# Patient Record
Sex: FEMALE | Race: WHITE | NOT HISPANIC OR LATINO | Employment: FULL TIME | URBAN - METROPOLITAN AREA
[De-identification: names, ages, dates, MRNs, and addresses within clinical notes are randomized per-mention and may not be internally consistent; named-entity substitution may affect disease eponyms.]

---

## 2017-01-23 ENCOUNTER — PATIENT MESSAGE (OUTPATIENT)
Dept: FAMILY MEDICINE | Facility: CLINIC | Age: 47
End: 2017-01-23

## 2017-01-23 RX ORDER — TRAZODONE HYDROCHLORIDE 50 MG/1
50 TABLET ORAL NIGHTLY
Qty: 30 TABLET | Refills: 2 | Status: SHIPPED | OUTPATIENT
Start: 2017-01-23 | End: 2017-04-29 | Stop reason: SDUPTHER

## 2017-04-04 ENCOUNTER — HOSPITAL ENCOUNTER (OUTPATIENT)
Dept: RADIOLOGY | Facility: HOSPITAL | Age: 47
Discharge: HOME | End: 2017-04-04
Attending: FAMILY MEDICINE
Payer: COMMERCIAL

## 2017-04-04 DIAGNOSIS — Z12.31 OTHER SCREENING MAMMOGRAM: ICD-10-CM

## 2017-04-04 PROCEDURE — 77067 SCR MAMMO BI INCL CAD: CPT | Mod: TC

## 2017-04-04 PROCEDURE — 77063 BREAST TOMOSYNTHESIS BI: CPT | Mod: 26,,, | Performed by: RADIOLOGY

## 2017-04-04 PROCEDURE — 77067 SCR MAMMO BI INCL CAD: CPT | Mod: 26,,, | Performed by: RADIOLOGY

## 2017-04-17 ENCOUNTER — PATIENT MESSAGE (OUTPATIENT)
Dept: FAMILY MEDICINE | Facility: CLINIC | Age: 47
End: 2017-04-17

## 2017-04-17 NOTE — TELEPHONE ENCOUNTER
Voicemail left for the patient advising a call back to scheduled an appointment. [employer] message has been sent to the patient advising that she need to call to schedule if the swelling is still present, also requested that for urgent issues the patient call.

## 2017-04-30 RX ORDER — TRAZODONE HYDROCHLORIDE 50 MG/1
TABLET ORAL
Qty: 30 TABLET | Refills: 5 | Status: SHIPPED | OUTPATIENT
Start: 2017-04-30 | End: 2017-12-11

## 2017-07-31 ENCOUNTER — TELEPHONE (OUTPATIENT)
Dept: FAMILY MEDICINE | Facility: CLINIC | Age: 47
End: 2017-07-31

## 2017-07-31 NOTE — TELEPHONE ENCOUNTER
----- Message from Claudia Stephenson sent at 7/31/2017  8:24 AM CDT -----  Doctor appointment and lab have been scheduled.  Please link lab orders to the lab appointment.  Date of doctor appointment:    Physical or EP:  9/25/2017 Annual Physical  Date of lab appointment:  9/18/2017  Comments:

## 2017-08-10 ENCOUNTER — TELEPHONE (OUTPATIENT)
Dept: FAMILY MEDICINE | Facility: CLINIC | Age: 47
End: 2017-08-10

## 2017-08-10 DIAGNOSIS — Z00.00 ANNUAL PHYSICAL EXAM: Primary | ICD-10-CM

## 2017-08-10 NOTE — TELEPHONE ENCOUNTER
----- Message from Claudia Stephenson sent at 8/10/2017  8:06 AM CDT -----  Doctor appointment and lab have been scheduled.  Please link lab orders to the lab appointment.  Date of doctor appointment:    Physical or EP:  12/11  Date of lab appointment:  12.1

## 2017-12-04 ENCOUNTER — LAB VISIT (OUTPATIENT)
Dept: LAB | Facility: HOSPITAL | Age: 47
End: 2017-12-04
Attending: FAMILY MEDICINE
Payer: COMMERCIAL

## 2017-12-04 DIAGNOSIS — Z00.00 ANNUAL PHYSICAL EXAM: ICD-10-CM

## 2017-12-04 LAB
ALBUMIN SERPL BCP-MCNC: 4 G/DL
ALP SERPL-CCNC: 53 U/L
ALT SERPL W/O P-5'-P-CCNC: 14 U/L
ANION GAP SERPL CALC-SCNC: 7 MMOL/L
AST SERPL-CCNC: 18 U/L
BASOPHILS # BLD AUTO: 0.04 K/UL
BASOPHILS NFR BLD: 0.7 %
BILIRUB SERPL-MCNC: 0.6 MG/DL
BUN SERPL-MCNC: 16 MG/DL
CALCIUM SERPL-MCNC: 9.3 MG/DL
CHLORIDE SERPL-SCNC: 107 MMOL/L
CHOLEST SERPL-MCNC: 174 MG/DL
CHOLEST/HDLC SERPL: 1.9 {RATIO}
CO2 SERPL-SCNC: 26 MMOL/L
CREAT SERPL-MCNC: 0.7 MG/DL
DIFFERENTIAL METHOD: NORMAL
EOSINOPHIL # BLD AUTO: 0.4 K/UL
EOSINOPHIL NFR BLD: 7.6 %
ERYTHROCYTE [DISTWIDTH] IN BLOOD BY AUTOMATED COUNT: 12.2 %
EST. GFR  (AFRICAN AMERICAN): >60 ML/MIN/1.73 M^2
EST. GFR  (NON AFRICAN AMERICAN): >60 ML/MIN/1.73 M^2
GLUCOSE SERPL-MCNC: 94 MG/DL
HCT VFR BLD AUTO: 41.8 %
HDLC SERPL-MCNC: 93 MG/DL
HDLC SERPL: 53.4 %
HGB BLD-MCNC: 13.8 G/DL
IMM GRANULOCYTES # BLD AUTO: 0.01 K/UL
IMM GRANULOCYTES NFR BLD AUTO: 0.2 %
LDLC SERPL CALC-MCNC: 69.2 MG/DL
LYMPHOCYTES # BLD AUTO: 2.6 K/UL
LYMPHOCYTES NFR BLD: 45.5 %
MCH RBC QN AUTO: 29.4 PG
MCHC RBC AUTO-ENTMCNC: 33 G/DL
MCV RBC AUTO: 89 FL
MONOCYTES # BLD AUTO: 0.4 K/UL
MONOCYTES NFR BLD: 7.3 %
NEUTROPHILS # BLD AUTO: 2.2 K/UL
NEUTROPHILS NFR BLD: 38.7 %
NONHDLC SERPL-MCNC: 81 MG/DL
NRBC BLD-RTO: 0 /100 WBC
PLATELET # BLD AUTO: 216 K/UL
PMV BLD AUTO: 9.6 FL
POTASSIUM SERPL-SCNC: 4.1 MMOL/L
PROT SERPL-MCNC: 7 G/DL
RBC # BLD AUTO: 4.7 M/UL
SODIUM SERPL-SCNC: 140 MMOL/L
TRIGL SERPL-MCNC: 59 MG/DL
TSH SERPL DL<=0.005 MIU/L-ACNC: 1.48 UIU/ML
WBC # BLD AUTO: 5.65 K/UL

## 2017-12-04 PROCEDURE — 85025 COMPLETE CBC W/AUTO DIFF WBC: CPT

## 2017-12-04 PROCEDURE — 80061 LIPID PANEL: CPT

## 2017-12-04 PROCEDURE — 80053 COMPREHEN METABOLIC PANEL: CPT

## 2017-12-04 PROCEDURE — 84443 ASSAY THYROID STIM HORMONE: CPT

## 2017-12-04 PROCEDURE — 36415 COLL VENOUS BLD VENIPUNCTURE: CPT | Mod: PO

## 2017-12-11 ENCOUNTER — LAB VISIT (OUTPATIENT)
Dept: LAB | Facility: HOSPITAL | Age: 47
End: 2017-12-11
Attending: FAMILY MEDICINE
Payer: COMMERCIAL

## 2017-12-11 ENCOUNTER — OFFICE VISIT (OUTPATIENT)
Dept: FAMILY MEDICINE | Facility: CLINIC | Age: 47
End: 2017-12-11
Payer: COMMERCIAL

## 2017-12-11 VITALS
WEIGHT: 104.94 LBS | TEMPERATURE: 98 F | HEIGHT: 62 IN | SYSTOLIC BLOOD PRESSURE: 104 MMHG | BODY MASS INDEX: 19.31 KG/M2 | DIASTOLIC BLOOD PRESSURE: 72 MMHG

## 2017-12-11 DIAGNOSIS — M77.8 RIGHT ELBOW TENDONITIS: ICD-10-CM

## 2017-12-11 DIAGNOSIS — G89.29 CHRONIC HIP PAIN, UNSPECIFIED LATERALITY: ICD-10-CM

## 2017-12-11 DIAGNOSIS — G43.009 MIGRAINE WITHOUT AURA AND WITHOUT STATUS MIGRAINOSUS, NOT INTRACTABLE: ICD-10-CM

## 2017-12-11 DIAGNOSIS — F51.01 PRIMARY INSOMNIA: ICD-10-CM

## 2017-12-11 DIAGNOSIS — Z00.00 ROUTINE GENERAL MEDICAL EXAMINATION AT A HEALTH CARE FACILITY: Primary | ICD-10-CM

## 2017-12-11 DIAGNOSIS — M25.559 CHRONIC HIP PAIN, UNSPECIFIED LATERALITY: ICD-10-CM

## 2017-12-11 DIAGNOSIS — R23.2 VASOMOTOR FLUSHING: ICD-10-CM

## 2017-12-11 DIAGNOSIS — M25.50 ARTHRALGIA, UNSPECIFIED JOINT: ICD-10-CM

## 2017-12-11 DIAGNOSIS — M25.559 HIP PAIN, CHRONIC, UNSPECIFIED LATERALITY: ICD-10-CM

## 2017-12-11 DIAGNOSIS — G89.29 HIP PAIN, CHRONIC, UNSPECIFIED LATERALITY: ICD-10-CM

## 2017-12-11 LAB
CCP AB SER IA-ACNC: 0.6 U/ML
CRP SERPL-MCNC: 0.5 MG/L
ERYTHROCYTE [SEDIMENTATION RATE] IN BLOOD BY WESTERGREN METHOD: 1 MM/HR
RHEUMATOID FACT SERPL-ACNC: <10 IU/ML

## 2017-12-11 PROCEDURE — 85651 RBC SED RATE NONAUTOMATED: CPT

## 2017-12-11 PROCEDURE — 99999 PR PBB SHADOW E&M-EST. PATIENT-LVL III: CPT | Mod: PBBFAC,,, | Performed by: FAMILY MEDICINE

## 2017-12-11 PROCEDURE — 86431 RHEUMATOID FACTOR QUANT: CPT

## 2017-12-11 PROCEDURE — 36415 COLL VENOUS BLD VENIPUNCTURE: CPT | Mod: PO

## 2017-12-11 PROCEDURE — 86200 CCP ANTIBODY: CPT

## 2017-12-11 PROCEDURE — 86038 ANTINUCLEAR ANTIBODIES: CPT

## 2017-12-11 PROCEDURE — 99396 PREV VISIT EST AGE 40-64: CPT | Mod: S$GLB,,, | Performed by: FAMILY MEDICINE

## 2017-12-11 PROCEDURE — 86140 C-REACTIVE PROTEIN: CPT

## 2017-12-11 PROCEDURE — 86039 ANTINUCLEAR ANTIBODIES (ANA): CPT

## 2017-12-11 PROCEDURE — 86235 NUCLEAR ANTIGEN ANTIBODY: CPT | Mod: 59

## 2017-12-11 RX ORDER — ALMOTRIPTAN 12.5 MG/1
TABLET, FILM COATED ORAL
Qty: 9 TABLET | Refills: 9 | Status: SHIPPED | OUTPATIENT
Start: 2017-12-11 | End: 2018-12-14 | Stop reason: SDUPTHER

## 2017-12-11 RX ORDER — SUMATRIPTAN SUCCINATE 100 MG/1
TABLET ORAL
Qty: 9 TABLET | Refills: 9 | Status: SHIPPED | OUTPATIENT
Start: 2017-12-11 | End: 2018-12-14 | Stop reason: SDUPTHER

## 2017-12-11 RX ORDER — ESCITALOPRAM OXALATE 10 MG/1
10 TABLET ORAL DAILY
Qty: 30 TABLET | Refills: 9 | Status: SHIPPED | OUTPATIENT
Start: 2017-12-11 | End: 2018-11-19 | Stop reason: SDUPTHER

## 2017-12-11 RX ORDER — MELOXICAM 15 MG/1
TABLET ORAL
Qty: 60 TABLET | Refills: 2 | Status: SHIPPED | OUTPATIENT
Start: 2017-12-11 | End: 2018-12-14 | Stop reason: SDUPTHER

## 2017-12-11 RX ORDER — ESCITALOPRAM OXALATE 10 MG/1
10 TABLET ORAL DAILY
Qty: 30 TABLET | Refills: 5 | Status: SHIPPED | OUTPATIENT
Start: 2017-12-11 | End: 2017-12-11 | Stop reason: SDUPTHER

## 2017-12-11 RX ORDER — CLONAZEPAM 0.5 MG/1
0.5 TABLET ORAL NIGHTLY PRN
Qty: 30 TABLET | Refills: 2 | Status: SHIPPED | OUTPATIENT
Start: 2017-12-11 | End: 2018-12-14

## 2017-12-11 NOTE — PROGRESS NOTES
Subjective:     Patient ID: Moody Ga is a 47 y.o. female.    Chief Complaint: Annual Exam    HPI  Review of Systems   Constitutional: Negative for activity change and unexpected weight change.   HENT: Negative for hearing loss, rhinorrhea and trouble swallowing.    Eyes: Negative for discharge and visual disturbance.   Respiratory: Negative for chest tightness and wheezing.    Cardiovascular: Negative for chest pain and palpitations.   Gastrointestinal: Negative for blood in stool, constipation, diarrhea and vomiting.   Endocrine: Negative for polydipsia and polyuria.   Genitourinary: Negative for difficulty urinating, dysuria, hematuria and menstrual problem.        Menses q 6-7 weeks, fatigue, apathy,   Musculoskeletal: Positive for arthralgias (rt elbow , left knee and left thumb pain off and on - as well as her labrum tears for whiuch she takes mobic prn). Negative for joint swelling and neck pain.   Neurological: Positive for headaches (off and on - a couple times a week. using imetrex or axert. ). Negative for weakness.   Psychiatric/Behavioral: Positive for dysphoric mood and sleep disturbance (trouble sleeping, hot and cold. hot flashes). Negative for confusion.       Objective:      Physical Exam   Constitutional: She is oriented to person, place, and time. She appears well-developed and well-nourished.   HENT:   Head: Normocephalic and atraumatic.   Right Ear: External ear normal.   Left Ear: External ear normal.   Nose: Nose normal.   Mouth/Throat: Oropharynx is clear and moist.   Eyes: Conjunctivae and EOM are normal. Pupils are equal, round, and reactive to light.   Neck: Normal range of motion. Neck supple. No JVD present. No thyromegaly present.   Cardiovascular: Normal rate, regular rhythm, normal heart sounds and intact distal pulses.    No murmur heard.  Pulmonary/Chest: Effort normal and breath sounds normal.   Abdominal: Soft. Bowel sounds are normal. She exhibits no mass. There is no  tenderness.   Musculoskeletal: Normal range of motion. She exhibits no edema.   Rt elbow pain at  Epicondyle, no other significant arthritic deformity or erythema or edema   Lymphadenopathy:     She has no cervical adenopathy.   Neurological: She is alert and oriented to person, place, and time. She has normal reflexes.   Skin: Skin is warm and dry.   Psychiatric: She has a normal mood and affect. Her behavior is normal. Judgment and thought content normal.   Vitals reviewed.      Assessment:     Moody was seen today for annual exam.    Diagnoses and all orders for this visit:    Routine general medical examination at a [facility] facility    Vasomotor flushing  -     Discontinue: escitalopram oxalate (LEXAPRO) 10 MG tablet; Take 1 tablet (10 mg total) by mouth once daily.  -     escitalopram oxalate (LEXAPRO) 10 MG tablet; Take 1 tablet (10 mg total) by mouth once daily.    Chronic hip pain, unspecified laterality    Arthralgia, unspecified joint  -     BRIAN; Future  -     C-reactive protein; Future  -     Cyclic citrul peptide antibody, IgG; Future  -     Rheumatoid factor; Future  -     Sedimentation rate, manual; Future    Migraine without aura and without status migrainosus, not intractable  -     almotriptan (AXERT) 12.5 MG tablet; take 1 tablet by mouth once daily (USE AT FIRST SIGN OF MIGRAINE--NOT TO EXCEED 2 TABLETS IN 24 HOURS)  -     sumatriptan (IMITREX) 100 MG tablet; take 1 tablet by mouth once daily (MAY REPEAT ONCE IN 2 HOURS)    Hip pain, chronic, unspecified laterality  -     meloxicam (MOBIC) 15 MG tablet; take 1 tablet by mouth once daily WITH A PRILOSEC 20MG OVER THE COUNTER    Right elbow tendonitis    Primary insomnia  Stop trazadone because we are adding escitalapram and I dont want her to get too much seratonin effect.   Will change to clonazepam at hs prn   -     clonazePAM (KLONOPIN) 0.5 MG tablet; Take 1 tablet (0.5 mg total) by mouth nightly as needed for Anxiety.  Hopefully as the  escitalopram begins to work -she will find she may not need the clonazepam.     She is to email me in another month with her progress on sleep and migraines frequency (if not better will increase topaimax to 100mg a day - and then if that doesn't help will refer to neurology)  And with progress on vasomotor symptoms with escitalopram.

## 2017-12-11 NOTE — PATIENT INSTRUCTIONS
Elbow tendon  Tennis Elbow  Muscles connect to bones by thick, fibrous cords (tendons). When the muscles are overused by repeated motion, the tendons may become inflamed and painful. This condition is called tendonitis.  Tennis elbow (lateral epicondylitis) is a form of tendonitis. It occurs when the forearm muscles are used again and again in a twisting motion. Pain from tennis elbow occurs mainly on the outside of the elbow. But the pain can spread into the forearm and wrist. Your elbow may also be swollen and tender to the touch.  The pain may get worse when you move your arm or do simple activities. Bending your wrist back, shaking hands, or turning a doorknob may cause pain. The pain often gets worse after several weeks or months. Sometimes you may feel pain when your arm is still.  Tennis players who use a backhand stroke with poor technique are more likely to get tennis elbow. But playing tennis is only one cause of tennis elbow. Other common activities that can cause it include:  · Hammering  · Painting  · Raking  Besides tennis players, people at risk include , gardeners, musicians, and dentists. Sometimes people get tennis elbow without doing anything that would cause the injury.  Treatment includes resting the arm and taking anti-inflammatory medicines. Special splints help ease symptoms. Symptoms should get better after 4 to 6 weeks of rest. You may need steroid injections if resting and using a splint dont help. After the pain is relieved, you should change your activities so the symptoms dont return. You may need physical therapy. It may include stretching, range-of-motion, and strengthening exercises. These treatments help most cases. You may need surgery if your symptoms continue for 6 months.  Home care  Follow these guidelines when caring for yourself at home:  · Rest your elbow as needed. Protect it from movement that causes pain. You may be told to use a forearm splint at night to  ease symptoms in the morning. Your health care provider may recommend a special wrap or splint to compress the muscles of the forearm. This can ease pain during daytime activities. As your symptoms get better, start to move your elbow more.  · Put an ice pack on the injured area. Do this for 20 minutes every 1 to 2 hours the first day for pain relief. You can make an ice pack by wrapping a plastic bag of ice cubes in a thin towel. Continue using the ice pack 3 to 4 times a day for the next 2 days. Then use the ice pack as needed to ease pain and swelling.  · You may use acetaminophen or ibuprofen to control pain, unless another pain medicine was prescribed. If you have chronic liver or kidney disease, talk with your health care provider before using these medicines. Also talk with your provider if youve had a stomach ulcer or GI bleeding.  · After your elbow heals, avoid the motion that caused your pain. Or learn to move in a way that causes less stress on the tendon. Using a forearm wrap may keep tennis elbow from happening again.  · A tennis elbow strap may ease pain and keep you from further injury when you start playing tennis again. You can also lower your risk for injury by warming up before you play and cooling down afterward. You should also use the right equipment. For instance, make sure your racquet has the right  and is the right size for you.  Follow-up care  Follow up with your health care provider, or as advised, if your symptoms dont get better after 1 week of treatment.  When to seek medical advice  Call your health care provider right away if any of these occur:  · Redness over the painful area  · Pain or swelling at the elbow gets worse  · Any numbness or tingling in your arm, hands, or fingers  · Unexplained fever over 101ºF (37.8ºC)   Date Last Reviewed: 2/17/2015  © 2059-3364 [employer]. [address] [place] [address], [place] [address]. All rights reserved. This information is not  intended as a substitute for professional medical care. Always follow your healthcare professional's instructions.

## 2017-12-12 LAB — ANA SER QL IF: POSITIVE

## 2017-12-13 LAB — ANA TITR SER IF: NORMAL {TITER}

## 2017-12-14 LAB
ANTI SM ANTIBODY: 0.51 EU
ANTI SM/RNP ANTIBODY: 1.49 EU
ANTI-SM INTERPRETATION: NEGATIVE
ANTI-SM/RNP INTERPRETATION: NEGATIVE
ANTI-SSA ANTIBODY: 1.6 EU
ANTI-SSA INTERPRETATION: NEGATIVE
ANTI-SSB ANTIBODY: 5.19 EU
ANTI-SSB INTERPRETATION: NEGATIVE
DSDNA AB SER-ACNC: NORMAL [IU]/ML

## 2018-02-11 DIAGNOSIS — G43.009 MIGRAINE WITHOUT AURA AND WITHOUT STATUS MIGRAINOSUS, NOT INTRACTABLE: ICD-10-CM

## 2018-02-12 RX ORDER — TOPIRAMATE 50 MG/1
TABLET, FILM COATED ORAL
Qty: 30 TABLET | Refills: 9 | Status: SHIPPED | OUTPATIENT
Start: 2018-02-12 | End: 2018-12-14 | Stop reason: SDUPTHER

## 2018-02-12 NOTE — TELEPHONE ENCOUNTER
----- Message from Vashti Chan sent at 2/12/2018  4:16 PM CST -----  Contact: [facility]   [phone]   Rx Name: topiramate (TOPAMAX) 50 MG tablet    Refill: yes     Pharmacy: [facility] [facility] [place] [address]    Comments:    Please call and advise.    Thank You

## 2018-07-30 ENCOUNTER — TELEPHONE (OUTPATIENT)
Dept: FAMILY MEDICINE | Facility: CLINIC | Age: 48
End: 2018-07-30

## 2018-07-30 DIAGNOSIS — Z12.39 SCREENING BREAST EXAMINATION: Primary | ICD-10-CM

## 2018-07-30 NOTE — TELEPHONE ENCOUNTER
----- Message from Chely Carmen sent at 7/30/2018  2:06 PM CDT -----  Contact: Moody Ga [phone]  Caller is requesting to schedule their annual screening mammogram appointment. Order is not listed in Epic.  Please enter order and contact patient to schedule.    Where would they like the mammogram performed?:  [facility]   Additional information:

## 2018-08-10 ENCOUNTER — HOSPITAL ENCOUNTER (OUTPATIENT)
Dept: RADIOLOGY | Facility: HOSPITAL | Age: 48
Discharge: HOME | End: 2018-08-10
Attending: FAMILY MEDICINE
Payer: COMMERCIAL

## 2018-08-10 VITALS — BODY MASS INDEX: 19.14 KG/M2 | WEIGHT: 104 LBS | HEIGHT: 62 IN

## 2018-08-10 DIAGNOSIS — Z12.39 SCREENING BREAST EXAMINATION: ICD-10-CM

## 2018-08-10 PROCEDURE — 77063 BREAST TOMOSYNTHESIS BI: CPT | Mod: 26,,, | Performed by: RADIOLOGY

## 2018-08-10 PROCEDURE — 77067 SCR MAMMO BI INCL CAD: CPT | Mod: 26,,, | Performed by: RADIOLOGY

## 2018-08-10 PROCEDURE — 77067 SCR MAMMO BI INCL CAD: CPT | Mod: TC,PO

## 2018-09-24 ENCOUNTER — PATIENT MESSAGE (OUTPATIENT)
Dept: FAMILY MEDICINE | Facility: CLINIC | Age: 48
End: 2018-09-24

## 2018-09-24 DIAGNOSIS — Z00.00 ROUTINE GENERAL MEDICAL EXAMINATION AT A HEALTH CARE FACILITY: Primary | ICD-10-CM

## 2018-11-19 DIAGNOSIS — R23.2 VASOMOTOR FLUSHING: ICD-10-CM

## 2018-11-19 RX ORDER — ESCITALOPRAM OXALATE 10 MG/1
TABLET ORAL
Qty: 30 TABLET | Refills: 9 | Status: SHIPPED | OUTPATIENT
Start: 2018-11-19 | End: 2018-12-14

## 2018-12-12 ENCOUNTER — LAB VISIT (OUTPATIENT)
Dept: LAB | Facility: HOSPITAL | Age: 48
End: 2018-12-12
Attending: FAMILY MEDICINE
Payer: COMMERCIAL

## 2018-12-12 DIAGNOSIS — Z00.00 ROUTINE GENERAL MEDICAL EXAMINATION AT A HEALTH CARE FACILITY: ICD-10-CM

## 2018-12-12 LAB
ALBUMIN SERPL BCP-MCNC: 3.9 G/DL
ALP SERPL-CCNC: 48 U/L
ALT SERPL W/O P-5'-P-CCNC: 14 U/L
ANION GAP SERPL CALC-SCNC: 7 MMOL/L
AST SERPL-CCNC: 21 U/L
BASOPHILS # BLD AUTO: 0.04 K/UL
BASOPHILS NFR BLD: 0.8 %
BILIRUB SERPL-MCNC: 0.6 MG/DL
BUN SERPL-MCNC: 14 MG/DL
CALCIUM SERPL-MCNC: 9.8 MG/DL
CHLORIDE SERPL-SCNC: 106 MMOL/L
CHOLEST SERPL-MCNC: 184 MG/DL
CHOLEST/HDLC SERPL: 1.9 {RATIO}
CO2 SERPL-SCNC: 26 MMOL/L
CREAT SERPL-MCNC: 0.7 MG/DL
DIFFERENTIAL METHOD: NORMAL
EOSINOPHIL # BLD AUTO: 0.2 K/UL
EOSINOPHIL NFR BLD: 3.6 %
ERYTHROCYTE [DISTWIDTH] IN BLOOD BY AUTOMATED COUNT: 12.8 %
EST. GFR  (AFRICAN AMERICAN): >60 ML/MIN/1.73 M^2
EST. GFR  (NON AFRICAN AMERICAN): >60 ML/MIN/1.73 M^2
GLUCOSE SERPL-MCNC: 87 MG/DL
HCT VFR BLD AUTO: 41.8 %
HDLC SERPL-MCNC: 95 MG/DL
HDLC SERPL: 51.6 %
HGB BLD-MCNC: 13.6 G/DL
IMM GRANULOCYTES # BLD AUTO: 0.01 K/UL
IMM GRANULOCYTES NFR BLD AUTO: 0.2 %
LDLC SERPL CALC-MCNC: 82.2 MG/DL
LYMPHOCYTES # BLD AUTO: 2.3 K/UL
LYMPHOCYTES NFR BLD: 46.2 %
MCH RBC QN AUTO: 29.9 PG
MCHC RBC AUTO-ENTMCNC: 32.5 G/DL
MCV RBC AUTO: 92 FL
MONOCYTES # BLD AUTO: 0.5 K/UL
MONOCYTES NFR BLD: 9 %
NEUTROPHILS # BLD AUTO: 2 K/UL
NEUTROPHILS NFR BLD: 40.2 %
NONHDLC SERPL-MCNC: 89 MG/DL
NRBC BLD-RTO: 0 /100 WBC
PLATELET # BLD AUTO: 263 K/UL
PMV BLD AUTO: 9.8 FL
POTASSIUM SERPL-SCNC: 4.4 MMOL/L
PROT SERPL-MCNC: 6.9 G/DL
RBC # BLD AUTO: 4.55 M/UL
SODIUM SERPL-SCNC: 139 MMOL/L
TRIGL SERPL-MCNC: 34 MG/DL
TSH SERPL DL<=0.005 MIU/L-ACNC: 2.15 UIU/ML
WBC # BLD AUTO: 4.98 K/UL

## 2018-12-12 PROCEDURE — 85025 COMPLETE CBC W/AUTO DIFF WBC: CPT

## 2018-12-12 PROCEDURE — 80053 COMPREHEN METABOLIC PANEL: CPT

## 2018-12-12 PROCEDURE — 84443 ASSAY THYROID STIM HORMONE: CPT

## 2018-12-12 PROCEDURE — 80061 LIPID PANEL: CPT

## 2018-12-12 PROCEDURE — 36415 COLL VENOUS BLD VENIPUNCTURE: CPT | Mod: PO

## 2018-12-14 ENCOUNTER — OFFICE VISIT (OUTPATIENT)
Dept: FAMILY MEDICINE | Facility: CLINIC | Age: 48
End: 2018-12-14
Payer: COMMERCIAL

## 2018-12-14 VITALS
SYSTOLIC BLOOD PRESSURE: 98 MMHG | HEART RATE: 76 BPM | BODY MASS INDEX: 20.98 KG/M2 | HEIGHT: 62 IN | WEIGHT: 114 LBS | DIASTOLIC BLOOD PRESSURE: 60 MMHG | TEMPERATURE: 99 F

## 2018-12-14 DIAGNOSIS — G43.009 MIGRAINE WITHOUT AURA AND WITHOUT STATUS MIGRAINOSUS, NOT INTRACTABLE: ICD-10-CM

## 2018-12-14 DIAGNOSIS — M77.8 RIGHT ELBOW TENDONITIS: ICD-10-CM

## 2018-12-14 DIAGNOSIS — H04.303 BILATERAL DACRYOCYSTITIS: ICD-10-CM

## 2018-12-14 DIAGNOSIS — M25.551 PAIN OF BOTH HIP JOINTS: ICD-10-CM

## 2018-12-14 DIAGNOSIS — M25.559 HIP PAIN, CHRONIC, UNSPECIFIED LATERALITY: ICD-10-CM

## 2018-12-14 DIAGNOSIS — G89.29 HIP PAIN, CHRONIC, UNSPECIFIED LATERALITY: ICD-10-CM

## 2018-12-14 DIAGNOSIS — F51.01 PRIMARY INSOMNIA: ICD-10-CM

## 2018-12-14 DIAGNOSIS — R23.2 VASOMOTOR FLUSHING: ICD-10-CM

## 2018-12-14 DIAGNOSIS — Z00.00 ROUTINE GENERAL MEDICAL EXAMINATION AT A HEALTH CARE FACILITY: Primary | ICD-10-CM

## 2018-12-14 DIAGNOSIS — Z79.890 HORMONE REPLACEMENT THERAPY (HRT): ICD-10-CM

## 2018-12-14 DIAGNOSIS — M25.552 PAIN OF BOTH HIP JOINTS: ICD-10-CM

## 2018-12-14 PROCEDURE — 99396 PREV VISIT EST AGE 40-64: CPT | Mod: S$GLB,,, | Performed by: FAMILY MEDICINE

## 2018-12-14 PROCEDURE — 99999 PR PBB SHADOW E&M-EST. PATIENT-LVL III: CPT | Mod: PBBFAC,,, | Performed by: FAMILY MEDICINE

## 2018-12-14 RX ORDER — TOPIRAMATE 50 MG/1
50 TABLET, FILM COATED ORAL 2 TIMES DAILY
Qty: 180 TABLET | Refills: 3 | Status: SHIPPED | OUTPATIENT
Start: 2018-12-14 | End: 2019-11-27 | Stop reason: SDUPTHER

## 2018-12-14 RX ORDER — MELOXICAM 15 MG/1
TABLET ORAL
Qty: 60 TABLET | Refills: 2 | Status: SHIPPED | OUTPATIENT
Start: 2018-12-14 | End: 2019-11-27 | Stop reason: SDUPTHER

## 2018-12-14 RX ORDER — SUMATRIPTAN SUCCINATE 100 MG/1
TABLET ORAL
Qty: 18 TABLET | Refills: 9 | Status: SHIPPED | OUTPATIENT
Start: 2018-12-14 | End: 2019-11-27 | Stop reason: SDUPTHER

## 2018-12-14 RX ORDER — ALMOTRIPTAN 12.5 MG/1
TABLET, FILM COATED ORAL
Qty: 18 TABLET | Refills: 9 | Status: SHIPPED | OUTPATIENT
Start: 2018-12-14 | End: 2019-11-27 | Stop reason: SDUPTHER

## 2018-12-14 NOTE — PROGRESS NOTES
Subjective:     Patient ID: Moody Ga is a 48 y.o. female.    Chief Complaint: Annual Exam    HPI  Review of Systems   Constitutional: Negative for appetite change, fatigue and fever.   HENT: Negative for hearing loss and sore throat.    Eyes: Negative.    Respiratory: Negative for cough, chest tightness, shortness of breath and wheezing.    Cardiovascular: Negative for chest pain, palpitations and leg swelling.   Gastrointestinal: Negative for abdominal pain, blood in stool, constipation, diarrhea, nausea and vomiting.   Endocrine: Negative.         Flushing - no relief with escitolapram   Genitourinary: Negative for difficulty urinating, dysuria, frequency, hematuria, menstrual problem, pelvic pain and urgency.   Musculoskeletal: Positive for arthralgias (hip pains). Negative for back pain.        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   Allergic/Immunologic: Negative.    Neurological: Positive for headaches (chronic migraines.  she is using close to 18 of the triptans each month. she uses at least 9 of the sumatriptan and 9 of the axert. -the one makes her sleepy so she uses 1 in daytime and 1 she uses at night. ). Negative for dizziness, syncope and light-headedness.        We had added the SSRI last year to see if that would help woith the hot flashes and with migraine prevention but it doesn't seem to be helping much for the hot flashes. She reports feeling hot every night. (not much in daytime but mostly at ngiht time they keep her awake all night). . She has very frequent severe hot flashes - she is awake a good part of the night because of those. She also has lots of morning headaches and her headache frequency is no better. She is having a headache every 3- 4 days -moderately severe and she has to take her aleve for this and the triptans .     Hematological: Negative for adenopathy.   Psychiatric/Behavioral:  Negative.  Negative for dysphoric mood and sleep disturbance.       Objective:      Physical Exam   Constitutional: She is oriented to person, place, and time. She appears well-developed and well-nourished.   HENT:   Head: Normocephalic and atraumatic.   Right Ear: External ear normal.   Left Ear: External ear normal.   Nose: Nose normal.   Mouth/Throat: Oropharynx is clear and moist.   Eyes: Conjunctivae and EOM are normal. Pupils are equal, round, and reactive to light.   Neck: Normal range of motion. Neck supple. No JVD present. No thyromegaly present.   Cardiovascular: Normal rate, regular rhythm, normal heart sounds and intact distal pulses.   No murmur heard.  Pulmonary/Chest: Effort normal and breath sounds normal.   Abdominal: Soft. Bowel sounds are normal. She exhibits no mass. There is no tenderness.   Musculoskeletal: Normal range of motion. She exhibits no edema.   Lymphadenopathy:     She has no cervical adenopathy.   Neurological: She is alert and oriented to person, place, and time. She has normal reflexes.   Skin: Skin is warm and dry.   Psychiatric: She has a normal mood and affect. Her behavior is normal. Judgment and thought content normal.   Vitals reviewed.      Assessment:     Moody was seen today for annual exam.    Diagnoses and all orders for this visit:    Routine general medical examination at a health care facility    Migraine without aura and without status migrainosus, not intractable  -     almotriptan (AXERT) 12.5 MG tablet; take 1 tablet by mouth once daily (USE AT FIRST SIGN OF MIGRAINE--NOT TO EXCEED 2 TABLETS IN 24 HOURS)  -     sumatriptan (IMITREX) 100 MG tablet; take 1 tablet by mouth once daily (MAY REPEAT ONCE IN 2 HOURS)  -     topiramate (TOPAMAX) 50 MG tablet; Take 1 tablet (50 mg total) by mouth 2 (two) times daily.    Hip pain, chronic, unspecified laterality  -     meloxicam (MOBIC) 15 MG tablet; take 1 tablet by mouth once daily WITH A PRILOSEC 20MG OVER THE  COUNTER    Bilateral dacryocystitis    Right elbow tendonitis    Pain of both hip joints    Vasomotor flushing    Primary insomnia    Hormone replacement therapy (HRT)    will stop Lexapro. Since it doesn't seem to be helping and it may contribute to seratonin syndrome when used with the triptan.     Then increase topirimate to 50 BID.   Will consider adding HRT instead. -this should help the hot flashes and hopefully she will sleep better and that will improve  Her migraines.   If not better I think she should see the migraine specialist.  I will get her their information and put in a referral that she should call and schedule if she isnt better

## 2018-12-17 ENCOUNTER — PATIENT MESSAGE (OUTPATIENT)
Dept: FAMILY MEDICINE | Facility: CLINIC | Age: 48
End: 2018-12-17

## 2018-12-17 RX ORDER — ESTRADIOL 0.5 MG/1
0.5 TABLET ORAL DAILY
Qty: 90 TABLET | Refills: 3 | Status: SHIPPED | OUTPATIENT
Start: 2018-12-17 | End: 2019-11-27 | Stop reason: SDUPTHER

## 2018-12-17 RX ORDER — PROGESTERONE 200 MG/1
200 CAPSULE ORAL DAILY
Qty: 36 CAPSULE | Refills: 3 | Status: SHIPPED | OUTPATIENT
Start: 2018-12-17 | End: 2019-11-27 | Stop reason: SDUPTHER

## 2018-12-27 ENCOUNTER — PATIENT MESSAGE (OUTPATIENT)
Dept: FAMILY MEDICINE | Facility: CLINIC | Age: 48
End: 2018-12-27

## 2018-12-27 DIAGNOSIS — R23.2 VASOMOTOR FLUSHING: ICD-10-CM

## 2018-12-27 RX ORDER — ESCITALOPRAM OXALATE 10 MG/1
10 TABLET ORAL DAILY
Qty: 30 TABLET | Refills: 9 | Status: SHIPPED | OUTPATIENT
Start: 2018-12-27 | End: 2019-11-27 | Stop reason: SDUPTHER

## 2019-09-03 ENCOUNTER — TELEPHONE (OUTPATIENT)
Dept: PRIMARY CARE CLINIC | Facility: CLINIC | Age: 49
End: 2019-09-03

## 2019-09-03 DIAGNOSIS — Z12.39 BREAST CANCER SCREENING: ICD-10-CM

## 2019-09-03 DIAGNOSIS — Z00.00 ANNUAL PHYSICAL EXAM: Primary | ICD-10-CM

## 2019-09-03 NOTE — TELEPHONE ENCOUNTER
Pt has an appointment to establish on 12/16/19. Would you be willing to have the mammogram order entered prior to the pt's establish care apt?

## 2019-09-03 NOTE — TELEPHONE ENCOUNTER
----- Message from Racheal Molina sent at 9/3/2019 12:55 PM CDT -----  Contact: self   Caller is requesting to schedule their annual screening mammogram appointment. Order is not listed in Epic.  Please enter order and contact patient to schedule.  Where would they like the mammogram performed?:  [place]  Would the patient rather receive a phone call back or a response via [facility]?:   Call back  Additional information:

## 2019-09-10 NOTE — TELEPHONE ENCOUNTER
Patient called and message left that lab and mammo orders have been placed. Patient can call and schedule.

## 2019-10-14 ENCOUNTER — HOSPITAL ENCOUNTER (OUTPATIENT)
Dept: RADIOLOGY | Facility: HOSPITAL | Age: 49
Discharge: HOME | End: 2019-10-14
Attending: HOSPITALIST
Payer: COMMERCIAL

## 2019-10-14 DIAGNOSIS — Z12.39 BREAST CANCER SCREENING: ICD-10-CM

## 2019-10-14 PROCEDURE — 77063 MAMMO DIGITAL SCREENING BILAT WITH TOMOSYNTHESIS_CAD: ICD-10-PCS | Mod: 26,,, | Performed by: RADIOLOGY

## 2019-10-14 PROCEDURE — 77067 SCR MAMMO BI INCL CAD: CPT | Mod: 26,,, | Performed by: RADIOLOGY

## 2019-10-14 PROCEDURE — 77063 BREAST TOMOSYNTHESIS BI: CPT | Mod: 26,,, | Performed by: RADIOLOGY

## 2019-10-14 PROCEDURE — 77067 MAMMO DIGITAL SCREENING BILAT WITH TOMOSYNTHESIS_CAD: ICD-10-PCS | Mod: 26,,, | Performed by: RADIOLOGY

## 2019-10-14 PROCEDURE — 77067 SCR MAMMO BI INCL CAD: CPT | Mod: TC,PO

## 2019-11-16 DIAGNOSIS — R23.2 VASOMOTOR FLUSHING: ICD-10-CM

## 2019-11-16 RX ORDER — ESCITALOPRAM OXALATE 10 MG/1
TABLET ORAL
Qty: 30 TABLET | Refills: 0 | OUTPATIENT
Start: 2019-11-16

## 2019-11-25 ENCOUNTER — PATIENT MESSAGE (OUTPATIENT)
Dept: INTERNAL MEDICINE | Facility: CLINIC | Age: 49
End: 2019-11-25

## 2019-11-25 NOTE — TELEPHONE ENCOUNTER
Patient called and message left to call the office to be rescheduled. Patient needing to be seen 1st before Rx is refilled.

## 2019-11-27 ENCOUNTER — LAB VISIT (OUTPATIENT)
Dept: LAB | Facility: HOSPITAL | Age: 49
End: 2019-11-27
Payer: COMMERCIAL

## 2019-11-27 ENCOUNTER — OFFICE VISIT (OUTPATIENT)
Dept: INTERNAL MEDICINE | Facility: CLINIC | Age: 49
End: 2019-11-27
Payer: COMMERCIAL

## 2019-11-27 VITALS
RESPIRATION RATE: 16 BRPM | TEMPERATURE: 98 F | HEART RATE: 76 BPM | DIASTOLIC BLOOD PRESSURE: 66 MMHG | BODY MASS INDEX: 21.91 KG/M2 | SYSTOLIC BLOOD PRESSURE: 106 MMHG | WEIGHT: 119.06 LBS | OXYGEN SATURATION: 99 % | HEIGHT: 62 IN

## 2019-11-27 DIAGNOSIS — G89.29 HIP PAIN, CHRONIC, UNSPECIFIED LATERALITY: ICD-10-CM

## 2019-11-27 DIAGNOSIS — M25.551 PAIN OF BOTH HIP JOINTS: ICD-10-CM

## 2019-11-27 DIAGNOSIS — R23.2 VASOMOTOR FLUSHING: ICD-10-CM

## 2019-11-27 DIAGNOSIS — Z00.00 ANNUAL PHYSICAL EXAM: ICD-10-CM

## 2019-11-27 DIAGNOSIS — F51.01 PRIMARY INSOMNIA: ICD-10-CM

## 2019-11-27 DIAGNOSIS — G43.009 MIGRAINE WITHOUT AURA AND WITHOUT STATUS MIGRAINOSUS, NOT INTRACTABLE: ICD-10-CM

## 2019-11-27 DIAGNOSIS — H04.303 BILATERAL DACRYOCYSTITIS: Primary | ICD-10-CM

## 2019-11-27 DIAGNOSIS — M25.552 PAIN OF BOTH HIP JOINTS: ICD-10-CM

## 2019-11-27 DIAGNOSIS — Z79.890 HORMONE REPLACEMENT THERAPY (HRT): ICD-10-CM

## 2019-11-27 DIAGNOSIS — M77.8 RIGHT ELBOW TENDONITIS: ICD-10-CM

## 2019-11-27 DIAGNOSIS — M25.559 HIP PAIN, CHRONIC, UNSPECIFIED LATERALITY: ICD-10-CM

## 2019-11-27 LAB
25(OH)D3+25(OH)D2 SERPL-MCNC: 23 NG/ML (ref 30–96)
ALBUMIN SERPL BCP-MCNC: 3.9 G/DL (ref 3.5–5.2)
ALP SERPL-CCNC: 59 U/L (ref 55–135)
ALT SERPL W/O P-5'-P-CCNC: 9 U/L (ref 10–44)
ANION GAP SERPL CALC-SCNC: 7 MMOL/L (ref 8–16)
AST SERPL-CCNC: 16 U/L (ref 10–40)
BASOPHILS # BLD AUTO: 0.03 K/UL (ref 0–0.2)
BASOPHILS NFR BLD: 0.5 % (ref 0–1.9)
BILIRUB SERPL-MCNC: 0.5 MG/DL (ref 0.1–1)
BUN SERPL-MCNC: 17 MG/DL (ref 6–20)
CALCIUM SERPL-MCNC: 9.3 MG/DL (ref 8.7–10.5)
CHLORIDE SERPL-SCNC: 106 MMOL/L (ref 95–110)
CHOLEST SERPL-MCNC: 164 MG/DL (ref 120–199)
CHOLEST/HDLC SERPL: 2 {RATIO} (ref 2–5)
CO2 SERPL-SCNC: 27 MMOL/L (ref 23–29)
CREAT SERPL-MCNC: 0.6 MG/DL (ref 0.5–1.4)
DIFFERENTIAL METHOD: NORMAL
EOSINOPHIL # BLD AUTO: 0.1 K/UL (ref 0–0.5)
EOSINOPHIL NFR BLD: 1.2 % (ref 0–8)
ERYTHROCYTE [DISTWIDTH] IN BLOOD BY AUTOMATED COUNT: 12.8 % (ref 11.5–14.5)
EST. GFR  (AFRICAN AMERICAN): >60 ML/MIN/1.73 M^2
EST. GFR  (NON AFRICAN AMERICAN): >60 ML/MIN/1.73 M^2
ESTIMATED AVG GLUCOSE: 100 MG/DL (ref 68–131)
GLUCOSE SERPL-MCNC: 89 MG/DL (ref 70–110)
HBA1C MFR BLD HPLC: 5.1 % (ref 4–5.6)
HCT VFR BLD AUTO: 42.5 % (ref 37–48.5)
HDLC SERPL-MCNC: 84 MG/DL (ref 40–75)
HDLC SERPL: 51.2 % (ref 20–50)
HGB BLD-MCNC: 13.8 G/DL (ref 12–16)
IMM GRANULOCYTES # BLD AUTO: 0.01 K/UL (ref 0–0.04)
IMM GRANULOCYTES NFR BLD AUTO: 0.2 % (ref 0–0.5)
LDLC SERPL CALC-MCNC: 63.2 MG/DL (ref 63–159)
LYMPHOCYTES # BLD AUTO: 1.5 K/UL (ref 1–4.8)
LYMPHOCYTES NFR BLD: 26.7 % (ref 18–48)
MCH RBC QN AUTO: 30.3 PG (ref 27–31)
MCHC RBC AUTO-ENTMCNC: 32.5 G/DL (ref 32–36)
MCV RBC AUTO: 93 FL (ref 82–98)
MONOCYTES # BLD AUTO: 0.4 K/UL (ref 0.3–1)
MONOCYTES NFR BLD: 6.8 % (ref 4–15)
NEUTROPHILS # BLD AUTO: 3.7 K/UL (ref 1.8–7.7)
NEUTROPHILS NFR BLD: 64.6 % (ref 38–73)
NONHDLC SERPL-MCNC: 80 MG/DL
NRBC BLD-RTO: 0 /100 WBC
PLATELET # BLD AUTO: 270 K/UL (ref 150–350)
PMV BLD AUTO: 9.8 FL (ref 9.2–12.9)
POTASSIUM SERPL-SCNC: 4.6 MMOL/L (ref 3.5–5.1)
PROT SERPL-MCNC: 6.8 G/DL (ref 6–8.4)
RBC # BLD AUTO: 4.55 M/UL (ref 4–5.4)
SODIUM SERPL-SCNC: 140 MMOL/L (ref 136–145)
TRIGL SERPL-MCNC: 84 MG/DL (ref 30–150)
TSH SERPL DL<=0.005 MIU/L-ACNC: 1.12 UIU/ML (ref 0.4–4)
WBC # BLD AUTO: 5.74 K/UL (ref 3.9–12.7)

## 2019-11-27 PROCEDURE — 84443 ASSAY THYROID STIM HORMONE: CPT

## 2019-11-27 PROCEDURE — 99396 PREV VISIT EST AGE 40-64: CPT | Mod: S$GLB,,, | Performed by: NURSE PRACTITIONER

## 2019-11-27 PROCEDURE — 82306 VITAMIN D 25 HYDROXY: CPT

## 2019-11-27 PROCEDURE — 99396 PR PREVENTIVE VISIT,EST,40-64: ICD-10-PCS | Mod: S$GLB,,, | Performed by: NURSE PRACTITIONER

## 2019-11-27 PROCEDURE — 86703 HIV-1/HIV-2 1 RESULT ANTBDY: CPT

## 2019-11-27 PROCEDURE — 36415 COLL VENOUS BLD VENIPUNCTURE: CPT | Mod: PO

## 2019-11-27 PROCEDURE — 83036 HEMOGLOBIN GLYCOSYLATED A1C: CPT

## 2019-11-27 PROCEDURE — 85025 COMPLETE CBC W/AUTO DIFF WBC: CPT

## 2019-11-27 PROCEDURE — 80053 COMPREHEN METABOLIC PANEL: CPT

## 2019-11-27 PROCEDURE — 99999 PR PBB SHADOW E&M-EST. PATIENT-LVL III: ICD-10-PCS | Mod: PBBFAC,,, | Performed by: NURSE PRACTITIONER

## 2019-11-27 PROCEDURE — 80061 LIPID PANEL: CPT

## 2019-11-27 PROCEDURE — 99999 PR PBB SHADOW E&M-EST. PATIENT-LVL III: CPT | Mod: PBBFAC,,, | Performed by: NURSE PRACTITIONER

## 2019-11-27 RX ORDER — ESTRADIOL 0.5 MG/1
0.5 TABLET ORAL DAILY
Qty: 90 TABLET | Refills: 3 | Status: SHIPPED | OUTPATIENT
Start: 2019-11-27 | End: 2020-11-03

## 2019-11-27 RX ORDER — ALMOTRIPTAN 12.5 MG/1
TABLET, FILM COATED ORAL
Qty: 18 TABLET | Refills: 9 | Status: SHIPPED | OUTPATIENT
Start: 2019-11-27 | End: 2021-03-08

## 2019-11-27 RX ORDER — PROGESTERONE 200 MG/1
200 CAPSULE ORAL DAILY
Qty: 36 CAPSULE | Refills: 3 | Status: SHIPPED | OUTPATIENT
Start: 2019-11-27 | End: 2020-11-26

## 2019-11-27 RX ORDER — SUMATRIPTAN SUCCINATE 100 MG/1
TABLET ORAL
Qty: 18 TABLET | Refills: 9 | Status: SHIPPED | OUTPATIENT
Start: 2019-11-27 | End: 2020-12-04

## 2019-11-27 RX ORDER — ESCITALOPRAM OXALATE 10 MG/1
10 TABLET ORAL DAILY
Qty: 30 TABLET | Refills: 9 | Status: SHIPPED | OUTPATIENT
Start: 2019-11-27 | End: 2020-10-01

## 2019-11-27 RX ORDER — TOPIRAMATE 50 MG/1
50 TABLET, FILM COATED ORAL 2 TIMES DAILY
Qty: 180 TABLET | Refills: 3 | Status: SHIPPED | OUTPATIENT
Start: 2019-11-27 | End: 2020-11-03

## 2019-11-27 RX ORDER — MELOXICAM 15 MG/1
TABLET ORAL
Qty: 60 TABLET | Refills: 2 | Status: SHIPPED | OUTPATIENT
Start: 2019-11-27 | End: 2020-05-26

## 2019-11-28 ENCOUNTER — PATIENT MESSAGE (OUTPATIENT)
Dept: INTERNAL MEDICINE | Facility: CLINIC | Age: 49
End: 2019-11-28

## 2019-11-29 LAB — HIV 1+2 AB+HIV1 P24 AG SERPL QL IA: NEGATIVE

## 2019-12-04 ENCOUNTER — TELEPHONE (OUTPATIENT)
Dept: ELECTROPHYSIOLOGY | Facility: CLINIC | Age: 49
End: 2019-12-04

## 2019-12-04 DIAGNOSIS — I49.3 PVC'S (PREMATURE VENTRICULAR CONTRACTIONS): Primary | ICD-10-CM

## 2019-12-06 ENCOUNTER — TELEPHONE (OUTPATIENT)
Dept: ELECTROPHYSIOLOGY | Facility: CLINIC | Age: 49
End: 2019-12-06

## 2019-12-06 NOTE — TELEPHONE ENCOUNTER
----- Message from eMna Silver sent at 12/6/2019  8:22 AM CST -----  Contact: patient  The Pt is retuning a call. Please call her back @ [phone]. Thanks, Mena

## 2019-12-08 NOTE — PROGRESS NOTES
[facility] Note    Chief Complaint      Chief Complaint   Patient presents with    Annual Exam     labs done today     History of Present Illness      Moody Ga is a 49 y.o. female former patient of Dr. Wilcox's  With chronic conditions of migraines, anxiety who presents today for her annual visit and med refills. Pt feeling well, no complaints, denies sob or cp. Reviewed meds and history with pt.   Flu vaccine- UTD  mammo- UTD, done 9/10/19      Problem List Items Addressed This Visit        Neuro    Migraine without aura and without status migrainosus, not intractable    Relevant Medications    almotriptan (AXERT) 12.5 MG tablet    escitalopram oxalate (LEXAPRO) 10 MG tablet    estradiol (ESTRACE) 0.5 MG tablet    meloxicam (MOBIC) 15 MG tablet    progesterone (PROMETRIUM) 200 MG capsule    sumatriptan (IMITREX) 100 MG tablet    topiramate (TOPAMAX) 50 MG tablet       Ophtho    Bilateral dacryocystitis - Primary    Relevant Medications    almotriptan (AXERT) 12.5 MG tablet    escitalopram oxalate (LEXAPRO) 10 MG tablet    estradiol (ESTRACE) 0.5 MG tablet    meloxicam (MOBIC) 15 MG tablet    progesterone (PROMETRIUM) 200 MG capsule    sumatriptan (IMITREX) 100 MG tablet    topiramate (TOPAMAX) 50 MG tablet       Endocrine    Hormone replacement therapy (HRT)    Relevant Medications    almotriptan (AXERT) 12.5 MG tablet    escitalopram oxalate (LEXAPRO) 10 MG tablet    estradiol (ESTRACE) 0.5 MG tablet    meloxicam (MOBIC) 15 MG tablet    progesterone (PROMETRIUM) 200 MG capsule    sumatriptan (IMITREX) 100 MG tablet    topiramate (TOPAMAX) 50 MG tablet       Orthopedic    Hip pain, chronic, unspecified laterality    Overview     Labrum tears-uses mobic once or twice a week         Relevant Medications    almotriptan (AXERT) 12.5 MG tablet    escitalopram oxalate (LEXAPRO) 10 MG tablet    estradiol (ESTRACE) 0.5 MG tablet    meloxicam (MOBIC) 15 MG tablet    progesterone (PROMETRIUM)  200 MG capsule    sumatriptan (IMITREX) 100 MG tablet    topiramate (TOPAMAX) 50 MG tablet    Right elbow tendonitis    Relevant Medications    almotriptan (AXERT) 12.5 MG tablet    escitalopram oxalate (LEXAPRO) 10 MG tablet    estradiol (ESTRACE) 0.5 MG tablet    meloxicam (MOBIC) 15 MG tablet    progesterone (PROMETRIUM) 200 MG capsule    sumatriptan (IMITREX) 100 MG tablet    topiramate (TOPAMAX) 50 MG tablet    Arthralgia    Relevant Medications    almotriptan (AXERT) 12.5 MG tablet    escitalopram oxalate (LEXAPRO) 10 MG tablet    estradiol (ESTRACE) 0.5 MG tablet    meloxicam (MOBIC) 15 MG tablet    progesterone (PROMETRIUM) 200 MG capsule    sumatriptan (IMITREX) 100 MG tablet    topiramate (TOPAMAX) 50 MG tablet       Other    Primary insomnia    Relevant Medications    almotriptan (AXERT) 12.5 MG tablet    escitalopram oxalate (LEXAPRO) 10 MG tablet    estradiol (ESTRACE) 0.5 MG tablet    meloxicam (MOBIC) 15 MG tablet    progesterone (PROMETRIUM) 200 MG capsule    sumatriptan (IMITREX) 100 MG tablet    topiramate (TOPAMAX) 50 MG tablet    Vasomotor flushing    Relevant Medications    almotriptan (AXERT) 12.5 MG tablet    escitalopram oxalate (LEXAPRO) 10 MG tablet    estradiol (ESTRACE) 0.5 MG tablet    meloxicam (MOBIC) 15 MG tablet    progesterone (PROMETRIUM) 200 MG capsule    sumatriptan (IMITREX) 100 MG tablet    topiramate (TOPAMAX) 50 MG tablet      Other Visit Diagnoses     Annual physical exam        Relevant Medications    almotriptan (AXERT) 12.5 MG tablet    escitalopram oxalate (LEXAPRO) 10 MG tablet    estradiol (ESTRACE) 0.5 MG tablet    meloxicam (MOBIC) 15 MG tablet    progesterone (PROMETRIUM) 200 MG capsule    sumatriptan (IMITREX) 100 MG tablet    topiramate (TOPAMAX) 50 MG tablet          Health Maintenance   Topic Date Due    Mammogram  10/14/2021    Pap Smear with HPV Cotest  11/21/2021    TETANUS VACCINE  08/27/2023    Lipid Panel  11/27/2024       Past Medical History:    Diagnosis Date    Migraine headache        Past Surgical History:   Procedure Laterality Date    LACRIMAL DUCT PROBING W/ STENT PLACEMENT         family history includes Migraines in her mother and sister; Osteoporosis in her mother.    Social History     Tobacco Use    Smoking status: Never Smoker    Smokeless tobacco: Never Used   Substance Use Topics    Alcohol use: Yes     Comment: 2 per week    Drug use: No       Review of Systems   Constitutional: Negative for chills and fever.   HENT: Negative for congestion, sinus pain and sore throat.    Eyes: Negative for blurred vision.   Respiratory: Negative for cough, shortness of breath and wheezing.    Cardiovascular: Negative for chest pain, palpitations and leg swelling.   Gastrointestinal: Negative for abdominal pain, constipation, diarrhea, nausea and vomiting.   Genitourinary: Negative for dysuria.   Musculoskeletal: Negative for myalgias.   Skin: Negative for rash.   Neurological: Negative for dizziness, weakness and headaches.   Psychiatric/Behavioral: Negative for depression. The patient is not nervous/anxious.         Outpatient Encounter Medications as of 11/27/2019   Medication Sig Dispense Refill    almotriptan (AXERT) 12.5 MG tablet take 1 tablet by mouth once daily (USE AT FIRST SIGN OF MIGRAINE--NOT TO EXCEED 2 TABLETS IN 24 HOURS) 18 tablet 9    escitalopram oxalate (LEXAPRO) 10 MG tablet Take 1 tablet (10 mg total) by mouth once daily. 30 tablet 9    estradiol (ESTRACE) 0.5 MG tablet Take 1 tablet (0.5 mg total) by mouth once daily. 90 tablet 3    meloxicam (MOBIC) 15 MG tablet take 1 tablet by mouth once daily WITH A PRILOSEC 20MG OVER THE COUNTER 60 tablet 2    progesterone (PROMETRIUM) 200 MG capsule Take 1 capsule (200 mg total) by mouth once daily. For 12 days a month 36 capsule 3    sumatriptan (IMITREX) 100 MG tablet take 1 tablet by mouth once daily (MAY REPEAT ONCE IN 2 HOURS) 18 tablet 9    topiramate (TOPAMAX) 50 MG tablet  "Take 1 tablet (50 mg total) by mouth 2 (two) times daily. 180 tablet 3    [DISCONTINUED] almotriptan (AXERT) 12.5 MG tablet take 1 tablet by mouth once daily (USE AT FIRST SIGN OF MIGRAINE--NOT TO EXCEED 2 TABLETS IN 24 HOURS) 18 tablet 9    [DISCONTINUED] escitalopram oxalate (LEXAPRO) 10 MG tablet Take 1 tablet (10 mg total) by mouth once daily. 30 tablet 9    [DISCONTINUED] estradiol (ESTRACE) 0.5 MG tablet Take 1 tablet (0.5 mg total) by mouth once daily. 90 tablet 3    [DISCONTINUED] meloxicam (MOBIC) 15 MG tablet take 1 tablet by mouth once daily WITH A PRILOSEC 20MG OVER THE COUNTER 60 tablet 2    [DISCONTINUED] progesterone (PROMETRIUM) 200 MG capsule Take 1 capsule (200 mg total) by mouth once daily. For 12 days a month 36 capsule 3    [DISCONTINUED] sumatriptan (IMITREX) 100 MG tablet take 1 tablet by mouth once daily (MAY REPEAT ONCE IN 2 HOURS) 18 tablet 9    [DISCONTINUED] topiramate (TOPAMAX) 50 MG tablet Take 1 tablet (50 mg total) by mouth 2 (two) times daily. 180 tablet 3     No facility-administered encounter medications on file as of 11/27/2019.         Review of patient's allergies indicates:   Allergen Reactions    Codeine Itching and Nausea And Vomiting     Dizziness/ Hallucinations        Physical Exam      Vital Signs  Temp: 98.1 °F (36.7 °C)  Temp src: Oral  Pulse: 76  Resp: 16  SpO2: 99 %  BP: 106/66  BP Location: Left arm  Patient Position: Sitting  Pain Score: 0-No pain  Height and Weight  Height: 5' 2" (157.5 cm)  Weight: 54 kg (119 lb 0.8 oz)  BSA (Calculated - sq m): 1.54 sq meters  BMI (Calculated): 21.8  Weight in (lb) to have BMI = 25: 136.4]    Physical Exam   Constitutional: She is oriented to person, place, and time. She appears well-developed and well-nourished.   HENT:   Head: Normocephalic and atraumatic.   Right Ear: External ear normal.   Left Ear: External ear normal.   Mouth/Throat: Oropharynx is clear and moist.   Eyes: Pupils are equal, round, and reactive to " light. Conjunctivae and EOM are normal.   Neck: Normal range of motion. Neck supple. No JVD present. No tracheal deviation present. No thyromegaly present.   Cardiovascular: Normal rate, regular rhythm, normal heart sounds and intact distal pulses.   No murmur heard.  Pulmonary/Chest: Effort normal and breath sounds normal. She exhibits no tenderness.   Abdominal: Soft. Bowel sounds are normal. There is no tenderness. There is no guarding.   Musculoskeletal: Normal range of motion.   Lymphadenopathy:     She has no cervical adenopathy.   Neurological: She is alert and oriented to person, place, and time.   Skin: Skin is warm and dry.   Psychiatric: She has a normal mood and affect. Her behavior is normal. Judgment and thought content normal.   Nursing note and vitals reviewed.       Laboratory:  CBC:  Recent Labs   Lab Result Units 11/27/19  1235   WBC K/uL 5.74   RBC M/uL 4.55   Hemoglobin g/dL 13.8   Hematocrit % 42.5   Platelets K/uL 270   Mean Corpuscular Volume fL 93   Mean Corpuscular Hemoglobin pg 30.3   Mean Corpuscular Hemoglobin Conc g/dL 32.5     CMP:  Recent Labs   Lab Result Units 11/27/19  1235   Glucose mg/dL 89   Calcium mg/dL 9.3   Albumin g/dL 3.9   Total Protein g/dL 6.8   Sodium mmol/L 140   Potassium mmol/L 4.6   CO2 mmol/L 27   Chloride mmol/L 106   BUN, Bld mg/dL 17   Alkaline Phosphatase U/L 59   ALT U/L 9*   AST U/L 16   Total Bilirubin mg/dL 0.5     URINALYSIS:  No results for input(s): COLORU, CLARITYU, SPECGRAV, PHUR, PROTEINUA, GLUCOSEU, BILIRUBINCON, BLOODU, WBCU, RBCU, BACTERIA, MUCUS, NITRITE, LEUKOCYTESUR, UROBILINOGEN, HYALINECASTS in the last 2160 hours.   LIPIDS:  Recent Labs   Lab Result Units 11/27/19  1235   TSH uIU/mL 1.122   HDL mg/dL 84*   Cholesterol mg/dL 164   Triglycerides mg/dL 84   LDL Cholesterol mg/dL 63.2   Hdl/Cholesterol Ratio % 51.2*   Non-HDL Cholesterol mg/dL 80   Total Cholesterol/HDL Ratio  2.0     TSH:  Recent Labs   Lab Result Units 11/27/19  1235   TSH  uIU/mL 1.122     A1C:  Recent Labs   Lab Result Units 11/27/19  1235   Hemoglobin A1C % 5.1         Assessment/Plan     Moody Ga is a 49 y.o.female with:    1. Annual physical exam  - almotriptan (AXERT) 12.5 MG tablet; take 1 tablet by mouth once daily (USE AT FIRST SIGN OF MIGRAINE--NOT TO EXCEED 2 TABLETS IN 24 HOURS)  Dispense: 18 tablet; Refill: 9  - escitalopram oxalate (LEXAPRO) 10 MG tablet; Take 1 tablet (10 mg total) by mouth once daily.  Dispense: 30 tablet; Refill: 9  - estradiol (ESTRACE) 0.5 MG tablet; Take 1 tablet (0.5 mg total) by mouth once daily.  Dispense: 90 tablet; Refill: 3  - meloxicam (MOBIC) 15 MG tablet; take 1 tablet by mouth once daily WITH A PRILOSEC 20MG OVER THE COUNTER  Dispense: 60 tablet; Refill: 2  - progesterone (PROMETRIUM) 200 MG capsule; Take 1 capsule (200 mg total) by mouth once daily. For 12 days a month  Dispense: 36 capsule; Refill: 3  - sumatriptan (IMITREX) 100 MG tablet; take 1 tablet by mouth once daily (MAY REPEAT ONCE IN 2 HOURS)  Dispense: 18 tablet; Refill: 9  - topiramate (TOPAMAX) 50 MG tablet; Take 1 tablet (50 mg total) by mouth 2 (two) times daily.  Dispense: 180 tablet; Refill: 3    2. Migraine without aura and without status migrainosus, not intractable  - almotriptan (AXERT) 12.5 MG tablet; take 1 tablet by mouth once daily (USE AT FIRST SIGN OF MIGRAINE--NOT TO EXCEED 2 TABLETS IN 24 HOURS)  Dispense: 18 tablet; Refill: 9  - escitalopram oxalate (LEXAPRO) 10 MG tablet; Take 1 tablet (10 mg total) by mouth once daily.  Dispense: 30 tablet; Refill: 9  - estradiol (ESTRACE) 0.5 MG tablet; Take 1 tablet (0.5 mg total) by mouth once daily.  Dispense: 90 tablet; Refill: 3  - meloxicam (MOBIC) 15 MG tablet; take 1 tablet by mouth once daily WITH A PRILOSEC 20MG OVER THE COUNTER  Dispense: 60 tablet; Refill: 2  - progesterone (PROMETRIUM) 200 MG capsule; Take 1 capsule (200 mg total) by mouth once daily. For 12 days a month  Dispense: 36 capsule;  Refill: 3  - sumatriptan (IMITREX) 100 MG tablet; take 1 tablet by mouth once daily (MAY REPEAT ONCE IN 2 HOURS)  Dispense: 18 tablet; Refill: 9  - topiramate (TOPAMAX) 50 MG tablet; Take 1 tablet (50 mg total) by mouth 2 (two) times daily.  Dispense: 180 tablet; Refill: 3    3. Vasomotor flushing  - almotriptan (AXERT) 12.5 MG tablet; take 1 tablet by mouth once daily (USE AT FIRST SIGN OF MIGRAINE--NOT TO EXCEED 2 TABLETS IN 24 HOURS)  Dispense: 18 tablet; Refill: 9  - escitalopram oxalate (LEXAPRO) 10 MG tablet; Take 1 tablet (10 mg total) by mouth once daily.  Dispense: 30 tablet; Refill: 9  - estradiol (ESTRACE) 0.5 MG tablet; Take 1 tablet (0.5 mg total) by mouth once daily.  Dispense: 90 tablet; Refill: 3  - meloxicam (MOBIC) 15 MG tablet; take 1 tablet by mouth once daily WITH A PRILOSEC 20MG OVER THE COUNTER  Dispense: 60 tablet; Refill: 2  - progesterone (PROMETRIUM) 200 MG capsule; Take 1 capsule (200 mg total) by mouth once daily. For 12 days a month  Dispense: 36 capsule; Refill: 3  - sumatriptan (IMITREX) 100 MG tablet; take 1 tablet by mouth once daily (MAY REPEAT ONCE IN 2 HOURS)  Dispense: 18 tablet; Refill: 9  - topiramate (TOPAMAX) 50 MG tablet; Take 1 tablet (50 mg total) by mouth 2 (two) times daily.  Dispense: 180 tablet; Refill: 3    4. Hip pain, chronic, unspecified laterality  - almotriptan (AXERT) 12.5 MG tablet; take 1 tablet by mouth once daily (USE AT FIRST SIGN OF MIGRAINE--NOT TO EXCEED 2 TABLETS IN 24 HOURS)  Dispense: 18 tablet; Refill: 9  - escitalopram oxalate (LEXAPRO) 10 MG tablet; Take 1 tablet (10 mg total) by mouth once daily.  Dispense: 30 tablet; Refill: 9  - estradiol (ESTRACE) 0.5 MG tablet; Take 1 tablet (0.5 mg total) by mouth once daily.  Dispense: 90 tablet; Refill: 3  - meloxicam (MOBIC) 15 MG tablet; take 1 tablet by mouth once daily WITH A PRILOSEC 20MG OVER THE COUNTER  Dispense: 60 tablet; Refill: 2  - progesterone (PROMETRIUM) 200 MG capsule; Take 1 capsule (200  mg total) by mouth once daily. For 12 days a month  Dispense: 36 capsule; Refill: 3  - sumatriptan (IMITREX) 100 MG tablet; take 1 tablet by mouth once daily (MAY REPEAT ONCE IN 2 HOURS)  Dispense: 18 tablet; Refill: 9  - topiramate (TOPAMAX) 50 MG tablet; Take 1 tablet (50 mg total) by mouth 2 (two) times daily.  Dispense: 180 tablet; Refill: 3    5. Bilateral dacryocystitis  - almotriptan (AXERT) 12.5 MG tablet; take 1 tablet by mouth once daily (USE AT FIRST SIGN OF MIGRAINE--NOT TO EXCEED 2 TABLETS IN 24 HOURS)  Dispense: 18 tablet; Refill: 9  - escitalopram oxalate (LEXAPRO) 10 MG tablet; Take 1 tablet (10 mg total) by mouth once daily.  Dispense: 30 tablet; Refill: 9  - estradiol (ESTRACE) 0.5 MG tablet; Take 1 tablet (0.5 mg total) by mouth once daily.  Dispense: 90 tablet; Refill: 3  - meloxicam (MOBIC) 15 MG tablet; take 1 tablet by mouth once daily WITH A PRILOSEC 20MG OVER THE COUNTER  Dispense: 60 tablet; Refill: 2  - progesterone (PROMETRIUM) 200 MG capsule; Take 1 capsule (200 mg total) by mouth once daily. For 12 days a month  Dispense: 36 capsule; Refill: 3  - sumatriptan (IMITREX) 100 MG tablet; take 1 tablet by mouth once daily (MAY REPEAT ONCE IN 2 HOURS)  Dispense: 18 tablet; Refill: 9  - topiramate (TOPAMAX) 50 MG tablet; Take 1 tablet (50 mg total) by mouth 2 (two) times daily.  Dispense: 180 tablet; Refill: 3    6. Hormone replacement therapy (HRT)  - almotriptan (AXERT) 12.5 MG tablet; take 1 tablet by mouth once daily (USE AT FIRST SIGN OF MIGRAINE--NOT TO EXCEED 2 TABLETS IN 24 HOURS)  Dispense: 18 tablet; Refill: 9  - escitalopram oxalate (LEXAPRO) 10 MG tablet; Take 1 tablet (10 mg total) by mouth once daily.  Dispense: 30 tablet; Refill: 9  - estradiol (ESTRACE) 0.5 MG tablet; Take 1 tablet (0.5 mg total) by mouth once daily.  Dispense: 90 tablet; Refill: 3  - meloxicam (MOBIC) 15 MG tablet; take 1 tablet by mouth once daily WITH A PRILOSEC 20MG OVER THE COUNTER  Dispense: 60 tablet;  Refill: 2  - progesterone (PROMETRIUM) 200 MG capsule; Take 1 capsule (200 mg total) by mouth once daily. For 12 days a month  Dispense: 36 capsule; Refill: 3  - sumatriptan (IMITREX) 100 MG tablet; take 1 tablet by mouth once daily (MAY REPEAT ONCE IN 2 HOURS)  Dispense: 18 tablet; Refill: 9  - topiramate (TOPAMAX) 50 MG tablet; Take 1 tablet (50 mg total) by mouth 2 (two) times daily.  Dispense: 180 tablet; Refill: 3    7. Pain of both hip joints  - almotriptan (AXERT) 12.5 MG tablet; take 1 tablet by mouth once daily (USE AT FIRST SIGN OF MIGRAINE--NOT TO EXCEED 2 TABLETS IN 24 HOURS)  Dispense: 18 tablet; Refill: 9  - escitalopram oxalate (LEXAPRO) 10 MG tablet; Take 1 tablet (10 mg total) by mouth once daily.  Dispense: 30 tablet; Refill: 9  - estradiol (ESTRACE) 0.5 MG tablet; Take 1 tablet (0.5 mg total) by mouth once daily.  Dispense: 90 tablet; Refill: 3  - meloxicam (MOBIC) 15 MG tablet; take 1 tablet by mouth once daily WITH A PRILOSEC 20MG OVER THE COUNTER  Dispense: 60 tablet; Refill: 2  - progesterone (PROMETRIUM) 200 MG capsule; Take 1 capsule (200 mg total) by mouth once daily. For 12 days a month  Dispense: 36 capsule; Refill: 3  - sumatriptan (IMITREX) 100 MG tablet; take 1 tablet by mouth once daily (MAY REPEAT ONCE IN 2 HOURS)  Dispense: 18 tablet; Refill: 9  - topiramate (TOPAMAX) 50 MG tablet; Take 1 tablet (50 mg total) by mouth 2 (two) times daily.  Dispense: 180 tablet; Refill: 3    8. Primary insomnia  - almotriptan (AXERT) 12.5 MG tablet; take 1 tablet by mouth once daily (USE AT FIRST SIGN OF MIGRAINE--NOT TO EXCEED 2 TABLETS IN 24 HOURS)  Dispense: 18 tablet; Refill: 9  - escitalopram oxalate (LEXAPRO) 10 MG tablet; Take 1 tablet (10 mg total) by mouth once daily.  Dispense: 30 tablet; Refill: 9  - estradiol (ESTRACE) 0.5 MG tablet; Take 1 tablet (0.5 mg total) by mouth once daily.  Dispense: 90 tablet; Refill: 3  - meloxicam (MOBIC) 15 MG tablet; take 1 tablet by mouth once daily WITH A  PRILOSEC 20MG OVER THE COUNTER  Dispense: 60 tablet; Refill: 2  - progesterone (PROMETRIUM) 200 MG capsule; Take 1 capsule (200 mg total) by mouth once daily. For 12 days a month  Dispense: 36 capsule; Refill: 3  - sumatriptan (IMITREX) 100 MG tablet; take 1 tablet by mouth once daily (MAY REPEAT ONCE IN 2 HOURS)  Dispense: 18 tablet; Refill: 9  - topiramate (TOPAMAX) 50 MG tablet; Take 1 tablet (50 mg total) by mouth 2 (two) times daily.  Dispense: 180 tablet; Refill: 3    9. Right elbow tendonitis  - almotriptan (AXERT) 12.5 MG tablet; take 1 tablet by mouth once daily (USE AT FIRST SIGN OF MIGRAINE--NOT TO EXCEED 2 TABLETS IN 24 HOURS)  Dispense: 18 tablet; Refill: 9  - escitalopram oxalate (LEXAPRO) 10 MG tablet; Take 1 tablet (10 mg total) by mouth once daily.  Dispense: 30 tablet; Refill: 9  - estradiol (ESTRACE) 0.5 MG tablet; Take 1 tablet (0.5 mg total) by mouth once daily.  Dispense: 90 tablet; Refill: 3  - meloxicam (MOBIC) 15 MG tablet; take 1 tablet by mouth once daily WITH A PRILOSEC 20MG OVER THE COUNTER  Dispense: 60 tablet; Refill: 2  - progesterone (PROMETRIUM) 200 MG capsule; Take 1 capsule (200 mg total) by mouth once daily. For 12 days a month  Dispense: 36 capsule; Refill: 3  - sumatriptan (IMITREX) 100 MG tablet; take 1 tablet by mouth once daily (MAY REPEAT ONCE IN 2 HOURS)  Dispense: 18 tablet; Refill: 9  - topiramate (TOPAMAX) 50 MG tablet; Take 1 tablet (50 mg total) by mouth 2 (two) times daily.  Dispense: 180 tablet; Refill: 3      -Continue current medications and maintain follow up with specialists.  Return to clinic in 1 year and as needed for any concerns      Erin Jiminez, NP-C [facility]

## 2020-01-22 ENCOUNTER — OFFICE VISIT (OUTPATIENT)
Dept: INTERNAL MEDICINE | Facility: CLINIC | Age: 50
End: 2020-01-22
Payer: COMMERCIAL

## 2020-01-22 VITALS
RESPIRATION RATE: 16 BRPM | HEIGHT: 62 IN | BODY MASS INDEX: 21.75 KG/M2 | TEMPERATURE: 99 F | DIASTOLIC BLOOD PRESSURE: 76 MMHG | HEART RATE: 76 BPM | WEIGHT: 118.19 LBS | SYSTOLIC BLOOD PRESSURE: 116 MMHG

## 2020-01-22 DIAGNOSIS — Z12.4 CERVICAL CANCER SCREENING: ICD-10-CM

## 2020-01-22 DIAGNOSIS — Z76.89 ENCOUNTER TO ESTABLISH CARE WITH NEW DOCTOR: Primary | ICD-10-CM

## 2020-01-22 PROCEDURE — 3008F PR BODY MASS INDEX (BMI) DOCUMENTED: ICD-10-PCS | Mod: CPTII,S$GLB,, | Performed by: HOSPITALIST

## 2020-01-22 PROCEDURE — 3008F BODY MASS INDEX DOCD: CPT | Mod: CPTII,S$GLB,, | Performed by: HOSPITALIST

## 2020-01-22 PROCEDURE — 99999 PR PBB SHADOW E&M-EST. PATIENT-LVL III: ICD-10-PCS | Mod: PBBFAC,,, | Performed by: HOSPITALIST

## 2020-01-22 PROCEDURE — 99213 PR OFFICE/OUTPT VISIT, EST, LEVL III, 20-29 MIN: ICD-10-PCS | Mod: S$GLB,,, | Performed by: HOSPITALIST

## 2020-01-22 PROCEDURE — 99999 PR PBB SHADOW E&M-EST. PATIENT-LVL III: CPT | Mod: PBBFAC,,, | Performed by: HOSPITALIST

## 2020-01-22 PROCEDURE — 99213 OFFICE O/P EST LOW 20 MIN: CPT | Mod: S$GLB,,, | Performed by: HOSPITALIST

## 2020-01-22 RX ORDER — AMOXICILLIN AND CLAVULANATE POTASSIUM 875; 125 MG/1; MG/1
TABLET, FILM COATED ORAL
COMMUNITY
Start: 2020-01-11 | End: 2020-12-03

## 2020-01-22 NOTE — PROGRESS NOTES
"Subjective:     @Patient ID: Moody Ga is a 50 y.o. female.    Chief Complaint: Annual Exam    HPI    50 y.o. female here to establish care. Pt is new to me. She recently had her physical done on 11/27/19. 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 She is  with kids. She reports hx of migraines and is on topamax for this. She reports HAs have improved with the medication. Only now uses triptans 2x/month now. She also reports having infection R tear duct and is on augmentin for this. She will be having lacrimal duct surgery with stent placement next week with outside ophthalmologist and ocular plastic surgeon.  She reports she is otherwise well.         Review of Systems   Constitutional: Negative for chills and fever.   HENT: Negative for congestion and sore throat.    Eyes: Negative for pain and visual disturbance.   Respiratory: Negative for cough and shortness of breath.    Cardiovascular: Negative for chest pain and leg swelling.   Gastrointestinal: Negative for abdominal pain, nausea and vomiting.   Musculoskeletal: Negative for arthralgias and back pain.   Neurological: Negative for weakness and headaches.   Psychiatric/Behavioral: Negative for agitation and confusion.     Past medical history, surgical history, and family medical history reviewed and updated as appropriate.    Medications and allergies reviewed.     Objective:     Vitals:    01/22/20 1300   BP: 116/76   BP Location: Right arm   Patient Position: Sitting   BP Method: Medium (Manual)   Pulse: 76   Resp: 16   Temp: 98.6 °F (37 °C)   TempSrc: Oral   Weight: 53.6 kg (118 lb 2.7 oz)   Height: 5' 2" (1.575 m)     Body mass index is 21.61 kg/m².  Physical Exam   Constitutional: She is oriented to person, place, and time. She appears well-developed and well-nourished. No distress.   HENT:   Head: Normocephalic and atraumatic.   Right Ear: External ear normal.   Left Ear: External ear normal.   Mouth/Throat: Oropharynx is clear " and moist. No oropharyngeal exudate.   Eyes: Conjunctivae are normal. Right eye exhibits no discharge. Left eye exhibits no discharge.   Neck: Normal range of motion. Neck supple.   Cardiovascular: Normal rate, regular rhythm and intact distal pulses. Exam reveals no friction rub.   No murmur heard.  Pulmonary/Chest: Effort normal and breath sounds normal.   Abdominal: Soft. Bowel sounds are normal. She exhibits no distension. There is no tenderness. There is no guarding.   Musculoskeletal: Normal range of motion. She exhibits no edema.   Neurological: She is alert and oriented to person, place, and time.   Skin: Skin is warm and dry.   Psychiatric: She has a normal mood and affect. Her behavior is normal.   Vitals reviewed.      Lab Results   Component Value Date    WBC 5.74 11/27/2019    HGB 13.8 11/27/2019    HCT 42.5 11/27/2019     11/27/2019    CHOL 164 11/27/2019    TRIG 84 11/27/2019    HDL 84 (H) 11/27/2019    ALT 9 (L) 11/27/2019    AST 16 11/27/2019     11/27/2019    K 4.6 11/27/2019     11/27/2019    CREATININE 0.6 11/27/2019    BUN 17 11/27/2019    CO2 27 11/27/2019    TSH 1.122 11/27/2019    INR 0.9 03/13/2006    HGBA1C 5.1 11/27/2019       Assessment:     1. Encounter to establish care with new doctor    2. Cervical cancer screening      Plan:   Moody was seen today for annual exam.    Diagnoses and all orders for this visit:    Encounter to establish care with new doctor        - Reviewed pt's medical history in clinic with pt. She is current with her screenings and immunizations. Will schedule an appt within the next year to establish care with ob/gyn.     Cervical cancer screening  -     Ambulatory Referral to Obstetrics / Gynecology          No follow-ups on file.    Damaris Hernández MD  Internal Medicine    1/22/2020

## 2020-02-24 ENCOUNTER — PATIENT MESSAGE (OUTPATIENT)
Dept: INTERNAL MEDICINE | Facility: CLINIC | Age: 50
End: 2020-02-24

## 2020-02-24 DIAGNOSIS — R76.11 POSITIVE TB TEST: Primary | ICD-10-CM

## 2020-02-26 NOTE — TELEPHONE ENCOUNTER
The patient called and a message left to call the office to schedule a cxr and lab test.  Infectious diease referral sent to our  to assist with scheduling.

## 2020-03-07 ENCOUNTER — HOSPITAL ENCOUNTER (OUTPATIENT)
Dept: RADIOLOGY | Facility: HOSPITAL | Age: 50
Discharge: HOME | End: 2020-03-07
Attending: HOSPITALIST
Payer: COMMERCIAL

## 2020-03-07 DIAGNOSIS — R76.11 POSITIVE TB TEST: ICD-10-CM

## 2020-03-07 PROCEDURE — 71046 XR CHEST PA AND LATERAL: ICD-10-PCS | Mod: 26,,, | Performed by: RADIOLOGY

## 2020-03-07 PROCEDURE — 71046 X-RAY EXAM CHEST 2 VIEWS: CPT | Mod: TC,PO

## 2020-03-07 PROCEDURE — 71046 X-RAY EXAM CHEST 2 VIEWS: CPT | Mod: 26,,, | Performed by: RADIOLOGY

## 2020-03-19 ENCOUNTER — TELEPHONE (OUTPATIENT)
Dept: INFECTIOUS DISEASES | Facility: CLINIC | Age: 50
End: 2020-03-19

## 2020-03-19 NOTE — TELEPHONE ENCOUNTER
Called pt to inform I cancelled her appointment for tomorrow. Left a detail message to call us back to schedule a virtual appointment next week, or a clinic appointment next month.

## 2020-05-24 DIAGNOSIS — F51.01 PRIMARY INSOMNIA: ICD-10-CM

## 2020-05-24 DIAGNOSIS — G89.29 HIP PAIN, CHRONIC, UNSPECIFIED LATERALITY: ICD-10-CM

## 2020-05-24 DIAGNOSIS — Z00.00 ANNUAL PHYSICAL EXAM: ICD-10-CM

## 2020-05-24 DIAGNOSIS — R23.2 VASOMOTOR FLUSHING: ICD-10-CM

## 2020-05-24 DIAGNOSIS — M25.551 PAIN OF BOTH HIP JOINTS: ICD-10-CM

## 2020-05-24 DIAGNOSIS — Z79.890 HORMONE REPLACEMENT THERAPY (HRT): ICD-10-CM

## 2020-05-24 DIAGNOSIS — H04.303 BILATERAL DACRYOCYSTITIS: ICD-10-CM

## 2020-05-24 DIAGNOSIS — M25.552 PAIN OF BOTH HIP JOINTS: ICD-10-CM

## 2020-05-24 DIAGNOSIS — G43.009 MIGRAINE WITHOUT AURA AND WITHOUT STATUS MIGRAINOSUS, NOT INTRACTABLE: ICD-10-CM

## 2020-05-24 DIAGNOSIS — M25.559 HIP PAIN, CHRONIC, UNSPECIFIED LATERALITY: ICD-10-CM

## 2020-05-24 DIAGNOSIS — M77.8 RIGHT ELBOW TENDONITIS: ICD-10-CM

## 2020-05-26 RX ORDER — MELOXICAM 15 MG/1
TABLET ORAL
Qty: 60 TABLET | Refills: 2 | Status: SHIPPED | OUTPATIENT
Start: 2020-05-26 | End: 2020-11-03

## 2020-11-20 ENCOUNTER — TELEPHONE (OUTPATIENT)
Dept: INTERNAL MEDICINE | Facility: CLINIC | Age: 50
End: 2020-11-20

## 2020-11-20 NOTE — TELEPHONE ENCOUNTER
Labs will be ordered at the time of her appointment.    Date of doctor appointment:  12/3/2020   Date of lab appointment:  12/03/2020   Physical or F/U:

## 2020-11-20 NOTE — TELEPHONE ENCOUNTER
----- Message from Maryuri Ramirez sent at 11/20/2020  9:20 AM CST -----  Contact: Patient @ [phone]  Doctor appointment and lab have been scheduled.  Please link lab orders to the lab appointment.  Date of doctor appointment:  12/3/2020  Date of lab appointment:  12/03/2020  Physical or F/U:   Comments:

## 2020-12-02 ENCOUNTER — TELEPHONE (OUTPATIENT)
Dept: INTERNAL MEDICINE | Facility: CLINIC | Age: 50
End: 2020-12-02

## 2020-12-02 DIAGNOSIS — Z79.890 HORMONE REPLACEMENT THERAPY (HRT): ICD-10-CM

## 2020-12-02 DIAGNOSIS — Z00.00 ANNUAL PHYSICAL EXAM: Primary | ICD-10-CM

## 2020-12-02 DIAGNOSIS — F51.01 PRIMARY INSOMNIA: ICD-10-CM

## 2020-12-02 NOTE — TELEPHONE ENCOUNTER
----- Message from Sherley Kuo sent at 12/2/2020  2:26 PM CST -----  Regarding: lab orders needed  The above patient is scheduled for a fasting lab appointment on tomorrow 12/03/20, but there are no orders in the system. Would you please add the necessary orders for the appointment.     Thanks  Sherley

## 2020-12-03 ENCOUNTER — LAB VISIT (OUTPATIENT)
Dept: LAB | Facility: HOSPITAL | Age: 50
End: 2020-12-03
Attending: NURSE PRACTITIONER
Payer: COMMERCIAL

## 2020-12-03 ENCOUNTER — OFFICE VISIT (OUTPATIENT)
Dept: INTERNAL MEDICINE | Facility: CLINIC | Age: 50
End: 2020-12-03
Payer: COMMERCIAL

## 2020-12-03 VITALS
HEIGHT: 62 IN | DIASTOLIC BLOOD PRESSURE: 68 MMHG | WEIGHT: 127 LBS | SYSTOLIC BLOOD PRESSURE: 110 MMHG | TEMPERATURE: 98 F | HEART RATE: 73 BPM | BODY MASS INDEX: 23.37 KG/M2

## 2020-12-03 DIAGNOSIS — F51.01 PRIMARY INSOMNIA: ICD-10-CM

## 2020-12-03 DIAGNOSIS — R23.2 HOT FLASHES: ICD-10-CM

## 2020-12-03 DIAGNOSIS — M25.559 HIP PAIN, CHRONIC, UNSPECIFIED LATERALITY: ICD-10-CM

## 2020-12-03 DIAGNOSIS — Z79.890 HORMONE REPLACEMENT THERAPY (HRT): ICD-10-CM

## 2020-12-03 DIAGNOSIS — Z00.00 ANNUAL PHYSICAL EXAM: ICD-10-CM

## 2020-12-03 DIAGNOSIS — G43.009 MIGRAINE WITHOUT AURA AND WITHOUT STATUS MIGRAINOSUS, NOT INTRACTABLE: ICD-10-CM

## 2020-12-03 DIAGNOSIS — Z12.39 ENCOUNTER FOR SCREENING FOR MALIGNANT NEOPLASM OF BREAST, UNSPECIFIED SCREENING MODALITY: ICD-10-CM

## 2020-12-03 DIAGNOSIS — G89.29 HIP PAIN, CHRONIC, UNSPECIFIED LATERALITY: ICD-10-CM

## 2020-12-03 DIAGNOSIS — Z00.00 ANNUAL PHYSICAL EXAM: Primary | ICD-10-CM

## 2020-12-03 LAB
25(OH)D3+25(OH)D2 SERPL-MCNC: 29 NG/ML (ref 30–96)
ALBUMIN SERPL BCP-MCNC: 4.2 G/DL (ref 3.5–5.2)
ALP SERPL-CCNC: 61 U/L (ref 55–135)
ALT SERPL W/O P-5'-P-CCNC: 15 U/L (ref 10–44)
ANION GAP SERPL CALC-SCNC: 8 MMOL/L (ref 8–16)
AST SERPL-CCNC: 20 U/L (ref 10–40)
BASOPHILS # BLD AUTO: 0.03 K/UL (ref 0–0.2)
BASOPHILS NFR BLD: 0.7 % (ref 0–1.9)
BILIRUB SERPL-MCNC: 0.5 MG/DL (ref 0.1–1)
BUN SERPL-MCNC: 18 MG/DL (ref 6–20)
CALCIUM SERPL-MCNC: 9.4 MG/DL (ref 8.7–10.5)
CHLORIDE SERPL-SCNC: 109 MMOL/L (ref 95–110)
CHOLEST SERPL-MCNC: 192 MG/DL (ref 120–199)
CHOLEST/HDLC SERPL: 1.9 {RATIO} (ref 2–5)
CO2 SERPL-SCNC: 25 MMOL/L (ref 23–29)
CREAT SERPL-MCNC: 0.7 MG/DL (ref 0.5–1.4)
DIFFERENTIAL METHOD: ABNORMAL
EOSINOPHIL # BLD AUTO: 0.2 K/UL (ref 0–0.5)
EOSINOPHIL NFR BLD: 4.2 % (ref 0–8)
ERYTHROCYTE [DISTWIDTH] IN BLOOD BY AUTOMATED COUNT: 12.9 % (ref 11.5–14.5)
EST. GFR  (AFRICAN AMERICAN): >60 ML/MIN/1.73 M^2
EST. GFR  (NON AFRICAN AMERICAN): >60 ML/MIN/1.73 M^2
GLUCOSE SERPL-MCNC: 91 MG/DL (ref 70–110)
HCT VFR BLD AUTO: 46.4 % (ref 37–48.5)
HDLC SERPL-MCNC: 100 MG/DL (ref 40–75)
HDLC SERPL: 52.1 % (ref 20–50)
HGB BLD-MCNC: 14.3 G/DL (ref 12–16)
IMM GRANULOCYTES # BLD AUTO: 0.01 K/UL (ref 0–0.04)
IMM GRANULOCYTES NFR BLD AUTO: 0.2 % (ref 0–0.5)
LDLC SERPL CALC-MCNC: 82.8 MG/DL (ref 63–159)
LYMPHOCYTES # BLD AUTO: 1.6 K/UL (ref 1–4.8)
LYMPHOCYTES NFR BLD: 35.4 % (ref 18–48)
MCH RBC QN AUTO: 29.8 PG (ref 27–31)
MCHC RBC AUTO-ENTMCNC: 30.8 G/DL (ref 32–36)
MCV RBC AUTO: 97 FL (ref 82–98)
MONOCYTES # BLD AUTO: 0.4 K/UL (ref 0.3–1)
MONOCYTES NFR BLD: 7.8 % (ref 4–15)
NEUTROPHILS # BLD AUTO: 2.3 K/UL (ref 1.8–7.7)
NEUTROPHILS NFR BLD: 51.7 % (ref 38–73)
NONHDLC SERPL-MCNC: 92 MG/DL
NRBC BLD-RTO: 0 /100 WBC
PLATELET # BLD AUTO: 235 K/UL (ref 150–350)
PMV BLD AUTO: 10.2 FL (ref 9.2–12.9)
POTASSIUM SERPL-SCNC: 5 MMOL/L (ref 3.5–5.1)
PROT SERPL-MCNC: 7.1 G/DL (ref 6–8.4)
RBC # BLD AUTO: 4.8 M/UL (ref 4–5.4)
SODIUM SERPL-SCNC: 142 MMOL/L (ref 136–145)
TRIGL SERPL-MCNC: 46 MG/DL (ref 30–150)
TSH SERPL DL<=0.005 MIU/L-ACNC: 1.34 UIU/ML (ref 0.4–4)
WBC # BLD AUTO: 4.49 K/UL (ref 3.9–12.7)

## 2020-12-03 PROCEDURE — 99396 PR PREVENTIVE VISIT,EST,40-64: ICD-10-PCS | Mod: S$GLB,,, | Performed by: NURSE PRACTITIONER

## 2020-12-03 PROCEDURE — 84443 ASSAY THYROID STIM HORMONE: CPT

## 2020-12-03 PROCEDURE — 3008F PR BODY MASS INDEX (BMI) DOCUMENTED: ICD-10-PCS | Mod: CPTII,S$GLB,, | Performed by: NURSE PRACTITIONER

## 2020-12-03 PROCEDURE — 82306 VITAMIN D 25 HYDROXY: CPT

## 2020-12-03 PROCEDURE — 80061 LIPID PANEL: CPT

## 2020-12-03 PROCEDURE — 3008F BODY MASS INDEX DOCD: CPT | Mod: CPTII,S$GLB,, | Performed by: NURSE PRACTITIONER

## 2020-12-03 PROCEDURE — 99396 PREV VISIT EST AGE 40-64: CPT | Mod: S$GLB,,, | Performed by: NURSE PRACTITIONER

## 2020-12-03 PROCEDURE — 85025 COMPLETE CBC W/AUTO DIFF WBC: CPT

## 2020-12-03 PROCEDURE — 83036 HEMOGLOBIN GLYCOSYLATED A1C: CPT

## 2020-12-03 PROCEDURE — 1126F PR PAIN SEVERITY QUANTIFIED, NO PAIN PRESENT: ICD-10-PCS | Mod: S$GLB,,, | Performed by: NURSE PRACTITIONER

## 2020-12-03 PROCEDURE — 99999 PR PBB SHADOW E&M-EST. PATIENT-LVL III: ICD-10-PCS | Mod: PBBFAC,,, | Performed by: NURSE PRACTITIONER

## 2020-12-03 PROCEDURE — 99999 PR PBB SHADOW E&M-EST. PATIENT-LVL III: CPT | Mod: PBBFAC,,, | Performed by: NURSE PRACTITIONER

## 2020-12-03 PROCEDURE — 36415 COLL VENOUS BLD VENIPUNCTURE: CPT | Mod: PO

## 2020-12-03 PROCEDURE — 80053 COMPREHEN METABOLIC PANEL: CPT

## 2020-12-03 PROCEDURE — 1126F AMNT PAIN NOTED NONE PRSNT: CPT | Mod: S$GLB,,, | Performed by: NURSE PRACTITIONER

## 2020-12-03 RX ORDER — ESCITALOPRAM OXALATE 10 MG/1
10 TABLET ORAL DAILY
Qty: 90 TABLET | Refills: 3 | Status: SHIPPED | OUTPATIENT
Start: 2020-12-03 | End: 2020-12-03 | Stop reason: ALTCHOICE

## 2020-12-03 RX ORDER — VENLAFAXINE HYDROCHLORIDE 37.5 MG/1
37.5 CAPSULE, EXTENDED RELEASE ORAL DAILY
Qty: 90 CAPSULE | Refills: 3 | Status: SHIPPED | OUTPATIENT
Start: 2020-12-03 | End: 2021-09-13

## 2020-12-03 NOTE — PROGRESS NOTES
[facility] Note    Chief Complaint      Chief Complaint   Patient presents with    Annual Exam     History of Present Illness      Moody Ga is a 50 y.o. female patient of Dr. Cole with chronic conditions of migraines, hip pain who presents today for annual visit exam. Pt feeling well, denies sob or cp, reviewed meds and history with pt.   Pt c/o weight gain over the past several months. Is taking lexapro, estrogen and progesterone currently for perimenopause and hot flashes. Has not had menstrual cycle in 9 months. Eats regular diet, stay hydrated, is very active.    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[place] [employer]    Labs-ordered  Gyn- PAP due next year  Eye exam- utd, contacts/glasses  Vaccine- utd  Mammogram- ordered        Problem List Items Addressed This Visit        Neuro    Migraine without aura and without status migrainosus, not intractable       Endocrine    Hormone replacement therapy (HRT)       Orthopedic    Hip pain, chronic, unspecified laterality    Overview     Labrum tears-uses mobic once or twice a week            Other    Primary insomnia      Other Visit Diagnoses     Annual physical exam    -  Primary    Hot flashes        Relevant Medications    venlafaxine (EFFEXOR-XR) 37.5 MG 24 hr capsule    Encounter for screening for malignant neoplasm of breast, unspecified screening modality        Relevant Orders    Mammo Digital Screening Bilat          Health Maintenance   Topic Date Due    Hepatitis C Screening  1970    Mammogram  10/14/2021    Pap Smear with HPV Cotest  11/21/2021    TETANUS VACCINE  08/27/2023    Lipid Panel  11/27/2024       Past Medical History:   Diagnosis Date    Migraine headache        Past Surgical History:   Procedure Laterality Date    LACRIMAL DUCT PROBING W/ STENT PLACEMENT         family history includes Colon cancer in her maternal grandfather; Migraines in her mother and sister; Osteoporosis in her mother.    Social History     Tobacco  Use    Smoking status: Never Smoker    Smokeless tobacco: Never Used   Substance Use Topics    Alcohol use: Yes     Comment: once a month     Drug use: No       Review of Systems   Constitutional: Negative for chills and fever.   HENT: Negative for congestion, sinus pain and sore throat.    Eyes: Negative for blurred vision.   Respiratory: Negative for cough, shortness of breath and wheezing.    Cardiovascular: Negative for chest pain, palpitations and leg swelling.   Gastrointestinal: Negative for abdominal pain, constipation, diarrhea, nausea and vomiting.   Genitourinary: Negative for dysuria.   Musculoskeletal: Negative for myalgias.   Skin: Negative for rash.   Neurological: Negative for dizziness, weakness and headaches.   Psychiatric/Behavioral: Negative for depression. The patient is not nervous/anxious.         Outpatient Encounter Medications as of 12/3/2020   Medication Sig Dispense Refill    almotriptan (AXERT) 12.5 MG tablet take 1 tablet by mouth once daily (USE AT FIRST SIGN OF MIGRAINE--NOT TO EXCEED 2 TABLETS IN 24 HOURS) 18 tablet 9    estradioL (ESTRACE) 0.5 MG tablet TAKE 1 TABLET(0.5 MG) BY MOUTH EVERY DAY 90 tablet 3    meloxicam (MOBIC) 15 MG tablet TAKE 1 TABLET BY MOUTH EVERY DAY. TAKE WITH 20MG PRILOSEC OVER THE COUNTER 60 tablet 2    sumatriptan (IMITREX) 100 MG tablet take 1 tablet by mouth once daily (MAY REPEAT ONCE IN 2 HOURS) 18 tablet 9    topiramate (TOPAMAX) 50 MG tablet TAKE 1 TABLET(50 MG) BY MOUTH TWICE DAILY 180 tablet 3    [DISCONTINUED] escitalopram oxalate (LEXAPRO) 10 MG tablet TAKE 1 TABLET(10 MG) BY MOUTH EVERY DAY 30 tablet 9    [DISCONTINUED] escitalopram oxalate (LEXAPRO) 10 MG tablet Take 1 tablet (10 mg total) by mouth once daily. 90 tablet 3    progesterone (PROMETRIUM) 200 MG capsule Take 1 capsule (200 mg total) by mouth once daily. For 12 days a month 36 capsule 3    venlafaxine (EFFEXOR-XR) 37.5 MG 24 hr capsule Take 1 capsule (37.5 mg total) by  "mouth once daily. 90 capsule 3    [DISCONTINUED] amoxicillin-clavulanate 875-125mg (AUGMENTIN) 875-125 mg per tablet TK 1 T PO  BID       No facility-administered encounter medications on file as of 12/3/2020.         Review of patient's allergies indicates:   Allergen Reactions    Codeine Itching and Nausea And Vomiting     Dizziness/ Hallucinations        Physical Exam      Vital Signs  Temp: 97.5 °F (36.4 °C)  Temp src: Temporal  Pulse: 73  BP: 110/68  BP Location: Right arm  Patient Position: Sitting  Pain Score: 0-No pain  Height and Weight  Height: 5' 2" (157.5 cm)  Weight: 57.6 kg (126 lb 15.8 oz)  BSA (Calculated - sq m): 1.59 sq meters  BMI (Calculated): 23.2  Weight in (lb) to have BMI = 25: 136.4]    Physical Exam  Vitals signs and nursing note reviewed.   Constitutional:       Appearance: Normal appearance. She is well-developed.   HENT:      Head: Normocephalic and atraumatic.      Right Ear: External ear normal.      Left Ear: External ear normal.   Eyes:      Conjunctiva/sclera: Conjunctivae normal.      Pupils: Pupils are equal, round, and reactive to light.   Neck:      Musculoskeletal: Normal range of motion and neck supple.      Thyroid: No thyromegaly.      Vascular: No JVD.      Trachea: No tracheal deviation.   Cardiovascular:      Rate and Rhythm: Normal rate and regular rhythm.      Heart sounds: Normal heart sounds. No murmur.   Pulmonary:      Effort: Pulmonary effort is normal.      Breath sounds: Normal breath sounds.   Chest:      Chest wall: No tenderness.   Abdominal:      General: Bowel sounds are normal.      Palpations: Abdomen is soft.      Tenderness: There is no abdominal tenderness. There is no guarding.   Musculoskeletal: Normal range of motion.   Lymphadenopathy:      Cervical: No cervical adenopathy.   Skin:     General: Skin is warm and dry.   Neurological:      Mental Status: She is alert and oriented to person, place, and time.   Psychiatric:         Behavior: Behavior " normal.         Thought Content: Thought content normal.         Judgment: Judgment normal.          Laboratory:  CBC:  No results for input(s): WBC, RBC, HGB, HCT, PLT, MCV, MCH, MCHC in the last 2160 hours.  CMP:  No results for input(s): GLU, CALCIUM, ALBUMIN, PROT, NA, K, CO2, CL, BUN, ALKPHOS, ALT, AST, BILITOT in the last 2160 hours.    Invalid input(s): CREATININ  URINALYSIS:  No results for input(s): COLORU, CLARITYU, SPECGRAV, PHUR, PROTEINUA, GLUCOSEU, BILIRUBINCON, BLOODU, WBCU, RBCU, BACTERIA, MUCUS, NITRITE, LEUKOCYTESUR, UROBILINOGEN, HYALINECASTS in the last 2160 hours.   LIPIDS:  No results for input(s): TSH, HDL, CHOL, TRIG, LDLCALC, CHOLHDL, NONHDLCHOL, TOTALCHOLEST in the last 2160 hours.  TSH:  No results for input(s): TSH in the last 2160 hours.  A1C:  No results for input(s): HGBA1C in the last 2160 hours.      Assessment/Plan     Moody Ga is a 50 y.o.female with:    Encounter Diagnoses   Name Primary?    Annual physical exam Yes    Hot flashes     Migraine without aura and without status migrainosus, not intractable     Hormone replacement therapy (HRT)     Hip pain, chronic, unspecified laterality     Primary insomnia     Encounter for screening for malignant neoplasm of breast, unspecified screening modality         PLAN  1.- will change from lexapro to effexor 37.5mg due to weight gain  2.-migraines- stable with meds  3.-HRT- tolerating well, pt will get a GYN  4.- insomnia- stable        -Continue current medications and maintain follow up with specialists.  Return to clinic in 1 year with Dr. Hernández or sooner for any concerns       Erin Jiminez, NP-C [facility]

## 2020-12-04 ENCOUNTER — PATIENT MESSAGE (OUTPATIENT)
Dept: INTERNAL MEDICINE | Facility: CLINIC | Age: 50
End: 2020-12-04

## 2020-12-04 DIAGNOSIS — M25.559 HIP PAIN, CHRONIC, UNSPECIFIED LATERALITY: ICD-10-CM

## 2020-12-04 DIAGNOSIS — R23.2 VASOMOTOR FLUSHING: ICD-10-CM

## 2020-12-04 DIAGNOSIS — F51.01 PRIMARY INSOMNIA: ICD-10-CM

## 2020-12-04 DIAGNOSIS — M25.552 PAIN OF BOTH HIP JOINTS: ICD-10-CM

## 2020-12-04 DIAGNOSIS — G43.009 MIGRAINE WITHOUT AURA AND WITHOUT STATUS MIGRAINOSUS, NOT INTRACTABLE: ICD-10-CM

## 2020-12-04 DIAGNOSIS — G89.29 HIP PAIN, CHRONIC, UNSPECIFIED LATERALITY: ICD-10-CM

## 2020-12-04 DIAGNOSIS — H04.303 BILATERAL DACRYOCYSTITIS: ICD-10-CM

## 2020-12-04 DIAGNOSIS — Z79.890 HORMONE REPLACEMENT THERAPY (HRT): ICD-10-CM

## 2020-12-04 DIAGNOSIS — M25.551 PAIN OF BOTH HIP JOINTS: ICD-10-CM

## 2020-12-04 DIAGNOSIS — M77.8 RIGHT ELBOW TENDONITIS: ICD-10-CM

## 2020-12-04 DIAGNOSIS — Z00.00 ANNUAL PHYSICAL EXAM: ICD-10-CM

## 2020-12-04 LAB
ESTIMATED AVG GLUCOSE: 97 MG/DL (ref 68–131)
HBA1C MFR BLD HPLC: 5 % (ref 4–5.6)

## 2020-12-04 RX ORDER — SUMATRIPTAN SUCCINATE 100 MG/1
TABLET ORAL
Qty: 18 TABLET | Refills: 9 | Status: SHIPPED | OUTPATIENT
Start: 2020-12-04 | End: 2021-12-06

## 2020-12-04 NOTE — TELEPHONE ENCOUNTER
Vitamin-D level at 29, range 30 to 100.  Can take a 1000 IU daily  Thyroid normal limits  A1c normal limits  Cholesterol panel shows HDL as elevated, triglyceride and LDL within normal limits  White blood cell and normal limits, no infection seen in differential, no anemia  Electrolytes, blood sugar, kidney and liver function all look fine

## 2021-01-04 ENCOUNTER — PATIENT MESSAGE (OUTPATIENT)
Dept: ADMINISTRATIVE | Facility: HOSPITAL | Age: 51
End: 2021-01-04

## 2021-01-27 ENCOUNTER — HOSPITAL ENCOUNTER (OUTPATIENT)
Dept: RADIOLOGY | Facility: HOSPITAL | Age: 51
Discharge: HOME | End: 2021-01-27
Attending: NURSE PRACTITIONER
Payer: COMMERCIAL

## 2021-01-27 ENCOUNTER — PATIENT MESSAGE (OUTPATIENT)
Dept: INTERNAL MEDICINE | Facility: CLINIC | Age: 51
End: 2021-01-27

## 2021-01-27 DIAGNOSIS — Z12.39 ENCOUNTER FOR SCREENING FOR MALIGNANT NEOPLASM OF BREAST, UNSPECIFIED SCREENING MODALITY: ICD-10-CM

## 2021-01-27 PROCEDURE — 77067 SCR MAMMO BI INCL CAD: CPT | Mod: TC,PO

## 2021-01-27 PROCEDURE — 77063 BREAST TOMOSYNTHESIS BI: CPT | Mod: 26,,, | Performed by: RADIOLOGY

## 2021-01-27 PROCEDURE — 77063 MAMMO DIGITAL SCREENING BILAT WITH TOMO: ICD-10-PCS | Mod: 26,,, | Performed by: RADIOLOGY

## 2021-01-27 PROCEDURE — 77067 MAMMO DIGITAL SCREENING BILAT WITH TOMO: ICD-10-PCS | Mod: 26,,, | Performed by: RADIOLOGY

## 2021-01-27 PROCEDURE — 77067 SCR MAMMO BI INCL CAD: CPT | Mod: 26,,, | Performed by: RADIOLOGY

## 2021-02-18 ENCOUNTER — PATIENT MESSAGE (OUTPATIENT)
Dept: ADMINISTRATIVE | Facility: HOSPITAL | Age: 51
End: 2021-02-18

## 2022-01-25 ENCOUNTER — PATIENT MESSAGE (OUTPATIENT)
Dept: ADMINISTRATIVE | Facility: HOSPITAL | Age: 52
End: 2022-01-25
Payer: COMMERCIAL

## 2022-03-16 ENCOUNTER — PATIENT MESSAGE (OUTPATIENT)
Dept: ADMINISTRATIVE | Facility: HOSPITAL | Age: 52
End: 2022-03-16
Payer: COMMERCIAL

## 2022-09-14 DIAGNOSIS — Z12.31 OTHER SCREENING MAMMOGRAM: ICD-10-CM

## 2022-09-20 ENCOUNTER — PATIENT MESSAGE (OUTPATIENT)
Dept: ADMINISTRATIVE | Facility: HOSPITAL | Age: 52
End: 2022-09-20
Payer: COMMERCIAL

## 2022-12-12 DIAGNOSIS — E55.9 VITAMIN D INSUFFICIENCY: Primary | ICD-10-CM

## 2022-12-12 DIAGNOSIS — M25.559 HIP PAIN, CHRONIC, UNSPECIFIED LATERALITY: ICD-10-CM

## 2022-12-12 DIAGNOSIS — G89.29 HIP PAIN, CHRONIC, UNSPECIFIED LATERALITY: ICD-10-CM

## 2022-12-12 DIAGNOSIS — G43.009 MIGRAINE WITHOUT AURA AND WITHOUT STATUS MIGRAINOSUS, NOT INTRACTABLE: ICD-10-CM

## 2022-12-12 DIAGNOSIS — F51.01 PRIMARY INSOMNIA: ICD-10-CM

## 2022-12-12 DIAGNOSIS — Z79.890 HORMONE REPLACEMENT THERAPY (HRT): ICD-10-CM

## 2022-12-12 DIAGNOSIS — Z00.00 ANNUAL PHYSICAL EXAM: ICD-10-CM

## 2022-12-12 RX ORDER — ESCITALOPRAM OXALATE 10 MG/1
TABLET ORAL
Qty: 90 TABLET | Refills: 0 | Status: SHIPPED | OUTPATIENT
Start: 2022-12-12

## 2023-04-02 DIAGNOSIS — Z00.00 ANNUAL PHYSICAL EXAM: ICD-10-CM

## 2023-04-02 DIAGNOSIS — F51.01 PRIMARY INSOMNIA: ICD-10-CM

## 2023-04-02 DIAGNOSIS — Z79.890 HORMONE REPLACEMENT THERAPY (HRT): ICD-10-CM

## 2023-04-02 DIAGNOSIS — G89.29 HIP PAIN, CHRONIC, UNSPECIFIED LATERALITY: ICD-10-CM

## 2023-04-02 DIAGNOSIS — G43.009 MIGRAINE WITHOUT AURA AND WITHOUT STATUS MIGRAINOSUS, NOT INTRACTABLE: ICD-10-CM

## 2023-04-02 DIAGNOSIS — M25.559 HIP PAIN, CHRONIC, UNSPECIFIED LATERALITY: ICD-10-CM

## 2023-04-02 NOTE — TELEPHONE ENCOUNTER
Care Due:                  Date            Visit Type   Department     Provider  --------------------------------------------------------------------------------    Last Visit: None Found      None         None Found  Next Visit: None Scheduled  None         None Found                                                            Last  Test          Frequency    Reason                     Performed    Due Date  --------------------------------------------------------------------------------    Office Visit  12 months..  EScitalopram.............  Not Found    Overdue    [employer] Embedded Care Gaps. Reference number: [id number]. 4/02/2023   5:30:46 AM CDT

## 2023-04-03 RX ORDER — ESCITALOPRAM OXALATE 10 MG/1
TABLET ORAL
Qty: 90 TABLET | Refills: 0 | OUTPATIENT
Start: 2023-04-03